# Patient Record
Sex: MALE | Race: BLACK OR AFRICAN AMERICAN | Employment: FULL TIME | URBAN - METROPOLITAN AREA
[De-identification: names, ages, dates, MRNs, and addresses within clinical notes are randomized per-mention and may not be internally consistent; named-entity substitution may affect disease eponyms.]

---

## 2017-03-06 ENCOUNTER — OFFICE VISIT (OUTPATIENT)
Dept: FAMILY MEDICINE | Facility: CLINIC | Age: 62
End: 2017-03-06
Payer: COMMERCIAL

## 2017-03-06 VITALS
BODY MASS INDEX: 26.77 KG/M2 | OXYGEN SATURATION: 95 % | WEIGHT: 187 LBS | HEIGHT: 70 IN | HEART RATE: 93 BPM | DIASTOLIC BLOOD PRESSURE: 74 MMHG | TEMPERATURE: 97.6 F | SYSTOLIC BLOOD PRESSURE: 122 MMHG

## 2017-03-06 DIAGNOSIS — E78.5 HYPERLIPIDEMIA LDL GOAL <130: ICD-10-CM

## 2017-03-06 DIAGNOSIS — H61.23 BILATERAL IMPACTED CERUMEN: ICD-10-CM

## 2017-03-06 DIAGNOSIS — F17.200 TOBACCO USE DISORDER: ICD-10-CM

## 2017-03-06 DIAGNOSIS — I10 HYPERTENSION GOAL BP (BLOOD PRESSURE) < 140/90: Primary | ICD-10-CM

## 2017-03-06 PROCEDURE — 99214 OFFICE O/P EST MOD 30 MIN: CPT | Performed by: FAMILY MEDICINE

## 2017-03-06 PROCEDURE — 36415 COLL VENOUS BLD VENIPUNCTURE: CPT | Performed by: FAMILY MEDICINE

## 2017-03-06 PROCEDURE — 82043 UR ALBUMIN QUANTITATIVE: CPT | Performed by: FAMILY MEDICINE

## 2017-03-06 PROCEDURE — 80061 LIPID PANEL: CPT | Performed by: FAMILY MEDICINE

## 2017-03-06 PROCEDURE — 80053 COMPREHEN METABOLIC PANEL: CPT | Performed by: FAMILY MEDICINE

## 2017-03-06 RX ORDER — SIMVASTATIN 20 MG
20 TABLET ORAL DAILY
Qty: 90 TABLET | Refills: 3 | Status: SHIPPED | OUTPATIENT
Start: 2017-03-06 | End: 2018-05-25

## 2017-03-06 RX ORDER — LISINOPRIL 20 MG/1
20 TABLET ORAL DAILY
Qty: 90 TABLET | Refills: 3 | Status: SHIPPED | OUTPATIENT
Start: 2017-03-06 | End: 2018-05-25

## 2017-03-06 RX ORDER — HYDROCHLOROTHIAZIDE 25 MG/1
25 TABLET ORAL DAILY
Qty: 90 TABLET | Refills: 3 | Status: SHIPPED | OUTPATIENT
Start: 2017-03-06 | End: 2018-05-25

## 2017-03-06 NOTE — PROGRESS NOTES
"Chief Complaint   Patient presents with     Hypertension       Initial /74 (BP Location: Right arm, Patient Position: Chair, Cuff Size: Adult Regular)  Pulse 93  Temp 97.6  F (36.4  C) (Oral)  Ht 5' 10\" (1.778 m)  Wt 187 lb (84.8 kg)  SpO2 95%  BMI 26.83 kg/m2 Estimated body mass index is 26.83 kg/(m^2) as calculated from the following:    Height as of this encounter: 5' 10\" (1.778 m).    Weight as of this encounter: 187 lb (84.8 kg).  Medication Reconciliation: complete     Ksenia Waters MA      "

## 2017-03-06 NOTE — MR AVS SNAPSHOT
"              After Visit Summary   3/6/2017    Bezabeh Assefa    MRN: [id number]           Patient Information     Date Of Birth          1955        Visit Information        Provider Department      3/6/2017 11:00 AM Elias Ballesteros MD [facility]        Today's Diagnoses     Hypertension goal BP (blood pressure) < 140/90    -  1    Bilateral impacted cerumen        Tobacco use disorder        Hyperlipidemia LDL goal <130           Follow-ups after your visit        Who to contact     If you have questions or need follow up information about today's clinic visit or your schedule please contact [facility] directly at [phone].  Normal or non-critical lab and imaging results will be communicated to you by MyChart, letter or phone within 4 business days after the clinic has received the results. If you do not hear from us within 7 days, please contact the clinic through MyChart or phone. If you have a critical or abnormal lab result, we will notify you by phone as soon as possible.  Submit refill requests through [employer] or call your pharmacy and they will forward the refill request to us. Please allow 3 business days for your refill to be completed.          Additional Information About Your Visit        MyChart Information     [employer]ger lets you send messages to your doctor, view your test results, renew your prescriptions, schedule appointments and more. To sign up, go to [email or web address] . Click on \"Log in\" on the left side of the screen, which will take you to the Welcome page. Then click on \"Sign up Now\" on the right side of the page.     You will be asked to enter the access code listed below, as well as some personal information. Please follow the directions to create your username and password.     Your access code is: [email or web address]  Expires: 2017 12:05 PM     Your access code will  in 90 days. If you need help or a new code, please call your " "[facility] or [phone].        Care EveryWhere ID     This is your Care EveryWhere ID. This could be used by other organizations to access your [facility] medical records  [id number]        Your Vitals Were     Pulse Temperature Height Pulse Oximetry BMI (Body Mass Index)       93 97.6  F (36.4  C) (Oral) 5' 10\" (1.778 m) 95% 26.83 kg/m2        Blood Pressure from Last 3 Encounters:   03/06/17 122/74   06/12/16 120/82   05/09/16 116/78    Weight from Last 3 Encounters:   03/06/17 187 lb (84.8 kg)   06/12/16 184 lb (83.5 kg)   05/09/16 188 lb 6.4 oz (85.5 kg)              We Performed the Following     Albumin Random Urine Quantitative     Comprehensive metabolic panel (BMP + Alb, Alk Phos, ALT, AST, Total. Bili, TP)     Lipid Profile     REMOVE IMPACTED CERUMEN          Today's Medication Changes          These changes are accurate as of: 3/6/17 12:05 PM.  If you have any questions, ask your nurse or doctor.               These medicines have changed or have updated prescriptions.        Dose/Directions    hydrochlorothiazide 25 MG tablet   Commonly known as:  HYDRODIURIL   This may have changed:  additional instructions   Used for:  Hypertension goal BP (blood pressure) < 140/90   Changed by:  Elias Ballesteros MD        Dose:  25 mg   Take 1 tablet (25 mg) by mouth daily   Quantity:  90 tablet   Refills:  3       lisinopril 20 MG tablet   Commonly known as:  PRINIVIL/ZESTRIL   This may have changed:  additional instructions   Used for:  Hypertension goal BP (blood pressure) < 140/90   Changed by:  Elias Ballesteros MD        Dose:  20 mg   Take 1 tablet (20 mg) by mouth daily   Quantity:  90 tablet   Refills:  3       simvastatin 20 MG tablet   Commonly known as:  ZOCOR   This may have changed:  additional instructions   Used for:  Hyperlipidemia LDL goal <130   Changed by:  Elias Ballesteros MD        Dose:  20 mg   Take 1 tablet (20 mg) by mouth daily   Quantity:  90 tablet "   Refills:  3            Where to get your medicines      These medications were sent to [id number] - [place], [place] - [address]  [address] [address]     Phone:  [phone]     hydrochlorothiazide 25 MG tablet    lisinopril 20 MG tablet    simvastatin 20 MG tablet                Primary Care Provider Office Phone # Fax #    Vickie Barone [phone]       [facility]  [address]  [facility] [address]        Thank you!     Thank you for choosing [facility]  for your care. Our goal is always to provide you with excellent care. Hearing back from our patients is one way we can continue to improve our services. Please take a few minutes to complete the written survey that you may receive in the mail after your visit with us. Thank you!             Your Updated Medication List - Protect others around you: Learn how to safely use, store and throw away your medicines at www.disposemymeds.org.          This list is accurate as of: 3/6/17 12:05 PM.  Always use your most recent med list.                   Brand Name Dispense Instructions for use    hydrochlorothiazide 25 MG tablet    HYDRODIURIL    90 tablet    Take 1 tablet (25 mg) by mouth daily       lisinopril 20 MG tablet    PRINIVIL/ZESTRIL    90 tablet    Take 1 tablet (20 mg) by mouth daily       simvastatin 20 MG tablet    ZOCOR    90 tablet    Take 1 tablet (20 mg) by mouth daily       vitamin D 2000 UNITS tablet     100 tablet    Take 2,000 Units by mouth daily.

## 2017-03-06 NOTE — PROGRESS NOTES
"  SUBJECTIVE:                                                    Bezabeh Assefa is a 61 year old male who presents to clinic today for the following health issues:      Hypertension Follow-up      Outpatient blood pressures are being checked at home.  Results are 118-125/75-85.    Low Salt Diet: not monitoring salt       Amount of exercise or physical activity: 2-3 days/week for an average of 30-45 minutes    Problems taking medications regularly: No    Medication side effects: none  Diet: regular (no restrictions)    Encounter Diagnoses   Name Primary?     Hypertension goal BP (blood pressure) < 140/90 Yes     Bilateral impacted cerumen, needs irrigation      Tobacco use disorder, trying to cut back      Hyperlipidemia LDL goal <130 on statin         Problem list and histories reviewed & adjusted, as indicated.  Additional history: as documented    Labs reviewed in EPIC    Reviewed and updated as needed this visit by clinical staff       Reviewed and updated as needed this visit by Provider         ROS:  Constitutional, HEENT, cardiovascular, pulmonary, gi and gu systems are negative, except as otherwise noted.    OBJECTIVE:                                                    /74 (BP Location: Right arm, Patient Position: Chair, Cuff Size: Adult Regular)  Pulse 93  Temp 97.6  F (36.4  C) (Oral)  Ht 5' 10\" (1.778 m)  Wt 187 lb (84.8 kg)  SpO2 95%  BMI 26.83 kg/m2  Body mass index is 26.83 kg/(m^2).  GENERAL: healthy, alert and no distress  EYES: Eyes grossly normal to inspection, PERRL and conjunctivae and sclerae normal  HENT: normal cephal  Cerumenosis is noted.  Wax is removed by syringing    are given.  Instructions for home care to prevent wax buildup are given., nose and mouth without ulcers or lesions, oropharynx clear and oral mucous membranes moist  NECK: no adenopathy, no asymmetry, masses, or scars and thyroid normal to palpation  RESP: lungs clear to auscultation - no rales, rhonchi or wheezes  CV: " regular rate and rhythm, normal S1 S2, no S3 or S4, no murmur, click or rub, no peripheral edema and peripheral pulses strong  ABDOMEN: soft, nontender, no hepatosplenomegaly, no masses and bowel sounds normal  PSYCH: mentation appears normal, affect normal/bright  LYMPH: no cervical, supraclavicular, axillary, or inguinal adenopathy         ASSESSMENT/PLAN:                                                            1. Hypertension goal BP (blood pressure) < 140/90  Well controlled   - Lipid Profile  - Comprehensive metabolic panel (BMP + Alb, Alk Phos, ALT, AST, Total. Bili, TP)  - lisinopril (PRINIVIL/ZESTRIL) 20 MG tablet; Take 1 tablet (20 mg) by mouth daily  Dispense: 90 tablet; Refill: 3  - hydrochlorothiazide (HYDRODIURIL) 25 MG tablet; Take 1 tablet (25 mg) by mouth daily  Dispense: 90 tablet; Refill: 3  - Albumin Random Urine Quantitative    2. Bilateral impacted cerumen  resolved  - REMOVE IMPACTED CERUMEN    3. Tobacco use disorder  Work on cessation    4. Hyperlipidemia LDL goal <130  Well controlled   - simvastatin (ZOCOR) 20 MG tablet; Take 1 tablet (20 mg) by mouth daily  Dispense: 90 tablet; Refill: 3  - Lipid Profile    Regular exercise  See Patient Instructions    Elias Ballesteros MD  [facility]

## 2017-03-06 NOTE — LETTER
[place] [id number] [place] [address]  [address]  [facility] [address] [phone]        March 7, 2017      Bezabeh Assefa  [facility] [address]   [facility] [address]          Dear Mr. Chester,    The results of your recent lab tests were within normal limits. Enclosed is a copy of these results.  If you have any further questions or problems, please contact our office.    Sincerely,        Elias Ballesteros MD        Results for orders placed or performed in visit on 03/06/17   Lipid Profile   Result Value Ref Range    Cholesterol 128 <200 mg/dL    Triglycerides 141 <150 mg/dL    HDL Cholesterol 27 (L) >39 mg/dL    LDL Cholesterol Calculated 73 <100 mg/dL    Non HDL Cholesterol 101 <130 mg/dL   Comprehensive metabolic panel (BMP + Alb, Alk Phos, ALT, AST, Total. Bili, TP)   Result Value Ref Range    Sodium 137 133 - 144 mmol/L    Potassium 3.5 3.4 - 5.3 mmol/L    Chloride 104 94 - 109 mmol/L    Carbon Dioxide 25 20 - 32 mmol/L    Anion Gap 8 3 - 14 mmol/L    Glucose 116 (H) 70 - 99 mg/dL    Urea Nitrogen 13 7 - 30 mg/dL    Creatinine 0.92 0.66 - 1.25 mg/dL    GFR Estimate 84 >60 mL/min/1.7m2    GFR Estimate If Black >90   GFR Calc   >60 mL/min/1.7m2    Calcium 9.1 8.5 - 10.1 mg/dL    Bilirubin Total 0.4 0.2 - 1.3 mg/dL    Albumin 4.2 3.4 - 5.0 g/dL    Protein Total 7.8 6.8 - 8.8 g/dL    Alkaline Phosphatase 70 40 - 150 U/L    ALT 35 0 - 70 U/L    AST 24 0 - 45 U/L   Albumin Random Urine Quantitative   Result Value Ref Range    Creatinine Urine 285 mg/dL    Albumin Urine mg/L 13 mg/L    Albumin Urine mg/g Cr 4.53 0 - 17 mg/g Cr

## 2017-03-07 LAB
ALBUMIN SERPL-MCNC: 4.2 G/DL (ref 3.4–5)
ALP SERPL-CCNC: 70 U/L (ref 40–150)
ALT SERPL W P-5'-P-CCNC: 35 U/L (ref 0–70)
ANION GAP SERPL CALCULATED.3IONS-SCNC: 8 MMOL/L (ref 3–14)
AST SERPL W P-5'-P-CCNC: 24 U/L (ref 0–45)
BILIRUB SERPL-MCNC: 0.4 MG/DL (ref 0.2–1.3)
BUN SERPL-MCNC: 13 MG/DL (ref 7–30)
CALCIUM SERPL-MCNC: 9.1 MG/DL (ref 8.5–10.1)
CHLORIDE SERPL-SCNC: 104 MMOL/L (ref 94–109)
CHOLEST SERPL-MCNC: 128 MG/DL
CO2 SERPL-SCNC: 25 MMOL/L (ref 20–32)
CREAT SERPL-MCNC: 0.92 MG/DL (ref 0.66–1.25)
CREAT UR-MCNC: 285 MG/DL
GFR SERPL CREATININE-BSD FRML MDRD: 84 ML/MIN/1.7M2
GLUCOSE SERPL-MCNC: 116 MG/DL (ref 70–99)
HDLC SERPL-MCNC: 27 MG/DL
LDLC SERPL CALC-MCNC: 73 MG/DL
MICROALBUMIN UR-MCNC: 13 MG/L
MICROALBUMIN/CREAT UR: 4.53 MG/G CR (ref 0–17)
NONHDLC SERPL-MCNC: 101 MG/DL
POTASSIUM SERPL-SCNC: 3.5 MMOL/L (ref 3.4–5.3)
PROT SERPL-MCNC: 7.8 G/DL (ref 6.8–8.8)
SODIUM SERPL-SCNC: 137 MMOL/L (ref 133–144)
TRIGL SERPL-MCNC: 141 MG/DL

## 2017-07-26 ENCOUNTER — OFFICE VISIT (OUTPATIENT)
Dept: OPHTHALMOLOGY | Facility: CLINIC | Age: 62
End: 2017-07-26

## 2017-07-26 DIAGNOSIS — H52.222 REGULAR ASTIGMATISM OF LEFT EYE: Primary | ICD-10-CM

## 2017-07-26 DIAGNOSIS — H02.889 MEIBOMIAN GLAND DYSFUNCTION (MGD): ICD-10-CM

## 2017-07-26 DIAGNOSIS — H05.821 EXTRAOCULAR MUSCLE WEAKNESS OF RIGHT EYE: ICD-10-CM

## 2017-07-26 ASSESSMENT — CUP TO DISC RATIO
OS_RATIO: 0.4
OD_RATIO: 0.4

## 2017-07-26 ASSESSMENT — REFRACTION_MANIFEST
OS_CYLINDER: +0.75
OD_ADD: +2.25
OD_SPHERE: +0.50
OD_CYLINDER: SPHERE
OS_SPHERE: PLANO
OS_AXIS: 170
OS_ADD: +2.25

## 2017-07-26 ASSESSMENT — VISUAL ACUITY
OS_SC: 20/20-2
OS_CC: 20/25
OD_SC: 20/20-1
OD_CC: 20/25
METHOD: SNELLEN - LINEAR

## 2017-07-26 ASSESSMENT — CONF VISUAL FIELD
OS_NORMAL: 1
OD_NORMAL: 1

## 2017-07-26 ASSESSMENT — EXTERNAL EXAM - LEFT EYE: OS_EXAM: NORMAL

## 2017-07-26 ASSESSMENT — SLIT LAMP EXAM - LIDS
COMMENTS: TR MGD
COMMENTS: TR MGD

## 2017-07-26 ASSESSMENT — TONOMETRY
OD_IOP_MMHG: 16
OS_IOP_MMHG: 15
IOP_METHOD: APPLANATION

## 2017-07-26 ASSESSMENT — EXTERNAL EXAM - RIGHT EYE: OD_EXAM: NORMAL

## 2017-07-26 NOTE — MR AVS SNAPSHOT
After Visit Summary   2017    Bezabeh Assefa    MRN: [id number]           Patient Information     Date Of Birth          1955        Visit Information        Provider Department      2017 1:20 PM Fred Marquez OD [facility]        Today's Diagnoses     Regular astigmatism of left eye    -  1    Meibomian gland dysfunction (MGD)        Extraocular muscle weakness of right eye           Follow-ups after your visit        Who to contact     Please call your clinic at [phone] to:    Ask questions about your health    Make or cancel appointments    Discuss your medicines    Learn about your test results    Speak to your doctor   If you have compliments or concerns about an experience at your clinic, or if you wish to file a complaint, please contact [facility] Patient Relations at [phone] or email us at [email or web address]         Additional Information About Your Visit        MyChart Information     CloudCover is an electronic gateway that provides easy, online access to your medical records. With CloudCover, you can request a clinic appointment, read your test results, renew a prescription or communicate with your care team.     To sign up for CloudCover visit the website at [email or web address]   You will be asked to enter the access code listed below, as well as some personal information. Please follow the directions to create your username and password.     Your access code is: [email or web address]  Expires: 10/22/2017  6:30 AM     Your access code will  in 90 days. If you need help or a new code, please contact your [facility] or call [phone] for assistance.        Care EveryWhere ID     This is your Care EveryWhere ID. This could be used by other organizations to access your [facility] medical records  [id number]         Blood Pressure from Last 3 Encounters:   17 122/74   16  120/82   05/09/16 116/78    Weight from Last 3 Encounters:   03/06/17 84.8 kg (187 lb)   06/12/16 83.5 kg (184 lb)   05/09/16 85.5 kg (188 lb 6.4 oz)              We Performed the Following     REFRACTION [[id number]]        Primary Care Provider Office Phone # Fax #    Vickie Barone [phone]       [facility] [place]  [address] [facility] [address]        Equal Access to Services     VLADISLAV ENGLISH : Hadii aad ku hadasho Soomaali, waaxda luqadaha, qaybta kaalmada adeegyada, waxay idiin hayaan adeeg kharahazel silverman . [facility] [phone].    ATENCIÓN: Si habla español, tiene a giles disposición servicios gratuitos de asistencia lingüística. Ll[facility] [phone].    We comply with applicable federal civil rights laws and [place] laws. We do not discriminate on the basis of race, color, national origin, age, disability sex, sexual orientation or gender identity.            Thank you!     Thank you for choosing [facility]  for your care. Our goal is always to provide you with excellent care. Hearing back from our patients is one way we can continue to improve our services. Please take a few minutes to complete the written survey that you may receive in the mail after your visit with us. Thank you!             Your Updated Medication List - Protect others around you: Learn how to safely use, store and throw away your medicines at www.disposemymeds.org.          This list is accurate as of: 7/26/17 11:59 PM.  Always use your most recent med list.                   Brand Name Dispense Instructions for use Diagnosis    hydrochlorothiazide 25 MG tablet    HYDRODIURIL    90 tablet    Take 1 tablet (25 mg) by mouth daily    Hypertension goal BP (blood pressure) < 140/90       lisinopril 20 MG tablet    PRINIVIL/ZESTRIL    90 tablet    Take 1 tablet (20 mg) by mouth daily    Hypertension goal BP (blood pressure) < 140/90       simvastatin 20 MG tablet    ZOCOR    90 tablet    Take 1  tablet (20 mg) by mouth daily    Hyperlipidemia LDL goal <130       vitamin D 2000 UNITS tablet     100 tablet    Take 2,000 Units by mouth daily.    Vitamin D deficiency

## 2017-07-26 NOTE — PROGRESS NOTES
Assessment/Plan:  (H52.222) Regular astigmatism of left eye  (primary encounter diagnosis)  Comment: Hyperopia OD, simple hyperopic astigmatism OS, presbyopia OU  Plan: Educated patient on condition and clinical findings. Dispensed spectacle prescription for full time wear. Educated patient on possibility of adaptation period, if symptoms do not improve return to clinic for further testing.    (H02.89) Meibomian gland dysfunction (MGD)  Comment: Symptomatic, likely cause of intermittent blurred vision  Plan: Recommended warm compresses twice each day for ten minutes. Continue using over the counter Systane artificial tears as needed. Monitor annually, or sooner if symptoms worsen.    (H05.821) Extraocular muscle weakness of right eye  Comment: Underaction of right lateral rectus, new onset, no diplopia in primary gaze  Plan: Refer to Dr. Gomez for further evaluation and dilated fundus exam. Patient deferred dilation at this visit.    Freda Fenton, Optometry Extern    Teaching Statement:    Complete documentation of historical and exam elements from today's encounter can  be found in the full encounter summary report (not reduplicated in this progress  note). I personally obtained the chief complaint(s) and history of present illness. I  confirmed and edited as necessary the review of systems, past medical/surgical  history, family history, social history, and examination findings as documented by  others; and I examined the patient myself. I personally reviewed the relevant tests,  images, and reports as documented above. I formulated and edited as necessary the  assessment and plan and discussed the findings and management plan with the  patient and family.    Fred Marquez, OD, FAAO

## 2017-09-06 ENCOUNTER — OFFICE VISIT (OUTPATIENT)
Dept: URGENT CARE | Facility: URGENT CARE | Age: 62
End: 2017-09-06
Payer: COMMERCIAL

## 2017-09-06 VITALS
SYSTOLIC BLOOD PRESSURE: 128 MMHG | HEART RATE: 81 BPM | WEIGHT: 182.5 LBS | TEMPERATURE: 97.5 F | OXYGEN SATURATION: 95 % | BODY MASS INDEX: 26.19 KG/M2 | DIASTOLIC BLOOD PRESSURE: 84 MMHG

## 2017-09-06 DIAGNOSIS — Z72.0 TOBACCO ABUSE: ICD-10-CM

## 2017-09-06 DIAGNOSIS — R05.8 PRODUCTIVE COUGH: Primary | ICD-10-CM

## 2017-09-06 PROCEDURE — 99213 OFFICE O/P EST LOW 20 MIN: CPT | Performed by: FAMILY MEDICINE

## 2017-09-06 RX ORDER — AZITHROMYCIN 250 MG/1
TABLET, FILM COATED ORAL
Qty: 6 TABLET | Refills: 0 | Status: SHIPPED | OUTPATIENT
Start: 2017-09-06 | End: 2017-09-11

## 2017-09-06 NOTE — NURSING NOTE
"Chief Complaint   Patient presents with     Cough     cough and running nose for a few days.        Initial /84  Pulse 81  Temp 97.5  F (36.4  C) (Oral)  Wt 182 lb 8 oz (82.8 kg)  SpO2 95%  BMI 26.19 kg/m2 Estimated body mass index is 26.19 kg/(m^2) as calculated from the following:    Height as of 3/6/17: 5' 10\" (1.778 m).    Weight as of this encounter: 182 lb 8 oz (82.8 kg).  Medication Reconciliation: complete    "

## 2017-09-06 NOTE — MR AVS SNAPSHOT
After Visit Summary   9/6/2017    Bezabeh Assefa    MRN: [id number]           Patient Information     Date Of Birth          1955        Visit Information        Provider Department      9/6/2017 11:00 AM Jae Ortiz DO [facility]        Today's Diagnoses     Productive cough    -  1    Tobacco abuse          Care Instructions      How to Quit Smoking  Smoking is one of the hardest habits to break. About half of all people who have ever smoked have been able to quit. Most people who still smoke want to quit. Here are some of the best ways to stop smoking.    Keep trying  Most smokers make many attempts at quitting before they are successful. It s important not to give up.  Go cold turkey  Most former smokers quit cold turkey (all at once). Trying to cut back gradually doesn't seem to work as well, perhaps because it continues the smoking habit. Also, it is possible to inhale more while smoking fewer cigarettes. This results in the same amount of nicotine in your body.  Get support  Support programs can be a big help, especially for heavy smokers. These groups offer lectures, ways to change behavior, and peer support. Here are some ways to find a support program:    Free national quitline: 800-QUIT-NOW (856-601-3675).    Hospital quit-smoking programs.    American Lung Association: (823.693.4252).    American Cancer Society (011-011-3852).  Support at home is important too. Nonsmokers can offer praise and encouragement. If the smoker in your life finds it hard to quit, encourage them to keep trying.  Over-the-counter medicines  Nicotine replacement therapy may make quitting easier. Certain aids, such as the nicotine patch, gum, and lozenges, are available without a prescription. It is best to use these under a doctor s care, though. The skin patch provides a steady supply of nicotine. Nicotine gum and lozenges give temporary bursts of low levels of nicotine. Both  "methods reduce the craving for cigarettes. Warning: If you have nausea, vomiting, dizziness, weakness, or a fast heartbeat, stop using these products and see your doctor.  Prescription medicines  After reviewing your smoking patterns and past attempts to quit, your doctor may offer a prescription medicine such as bupropion, varenicline, a nicotine inhaler, or nasal spray. Each has advantages and side effects. Your doctor can review these with you.  Health benefits of quitting  The benefits of quitting start right away and keep improving the longer you go without smoking. These benefits occur at any age.  So whether you are 17 or 70, quitting is a good decision. Some of the benefits include:    20 minutes: Blood pressure and pulse return to normal.    8 hours: Oxygen levels return to normal.    2 days: Ability to smell and taste begin to improve as damaged nerves regrow.    2 to 3 weeks: Circulation and lung function improve.    1 to 9 months: Coughing, congestion, and shortness of breath decrease; tiredness decreases.    1 year: Risk of heart attack decreases by half.    5 years: Risk of lung cancer decreases by half; risk of stroke becomes the same as a nonsmoker s.  For more on how to quit smoking, try these online resources:     Smokefree.gov    \"Clearing the Air\" booklet from the National Cancer [place]: smokefree.gov/sites/default/files/pdf/clearing-the-air-accessible.pdf  Date Last Reviewed: 3/1/2017    8468-7845 [employer]. [address] [place] [address]. All rights reserved. This information is not intended as a substitute for professional medical care. Always follow your healthcare professional's instructions.                Follow-ups after your visit        Who to contact     If you have questions or need follow up information about today's clinic visit or your schedule please contact [facility] directly at [phone].  Normal or non-critical lab " "and imaging results will be communicated to you by MyChart, letter or phone within 4 business days after the clinic has received the results. If you do not hear from us within 7 days, please contact the clinic through Codasystemt or phone. If you have a critical or abnormal lab result, we will notify you by phone as soon as possible.  Submit refill requests through Zymeworks or call your pharmacy and they will forward the refill request to us. Please allow 3 business days for your refill to be completed.          Additional Information About Your Visit        [email or web address] Information     Zymeworks lets you send messages to your doctor, view your test results, renew your prescriptions, schedule appointments and more. To sign up, go to [email or web address] . Click on \"Log in\" on the left side of the screen, which will take you to the Welcome page. Then click on \"Sign up Now\" on the right side of the page.     You will be asked to enter the access code listed below, as well as some personal information. Please follow the directions to create your username and password.     Your access code is: [email or web address]  Expires: 10/22/2017  6:30 AM     Your access code will  in 90 days. If you need help or a new code, please call your [facility] clinic or [phone].        Care EveryWhere ID     This is your Care EveryWhere ID. This could be used by other organizations to access your [facility] medical records  [id number]        Your Vitals Were     Pulse Temperature Pulse Oximetry BMI (Body Mass Index)          81 97.5  F (36.4  C) (Oral) 95% 26.19 kg/m2         Blood Pressure from Last 3 Encounters:   17 128/84   17 122/74   16 120/82    Weight from Last 3 Encounters:   17 182 lb 8 oz (82.8 kg)   17 187 lb (84.8 kg)   16 184 lb (83.5 kg)              Today, you had the following     No orders found for display         Today's Medication Changes          These changes are accurate as of: 17 " 11:37 AM.  If you have any questions, ask your nurse or doctor.               Start taking these medicines.        Dose/Directions    azithromycin 250 MG tablet   Commonly known as:  ZITHROMAX   Used for:  Productive cough, Tobacco abuse   Started by:  Jae Ortiz, DO        Two tablets first day, then one tablet daily for four days.   Quantity:  6 tablet   Refills:  0            Where to get your medicines      These medications were sent to [place] [address] [place] [facility] [address] [facility]  [address] [facility] [address]     Phone:  [phone]     azithromycin 250 MG tablet                Primary Care Provider Office Phone # Fax #    Vickie Barone [phone]       [facility] [place]  [address] [facility] [address]        Equal Access to Services     VLADISLAV ENGLISH : Hadii aad ku hadasho Sogurpreet, waaxda luqadaha, qaybta kaalmada adeegyada, waxay dionna calles. So [facility] [phone].    ATENCIÓN: Si habla español, tiene a giles disposición servicios gratuitos de asistencia lingüística. [facility] [phone].    We comply with applicable federal civil rights laws and [place] laws. We do not discriminate on the basis of race, color, national origin, age, disability sex, sexual orientation or gender identity.            Thank you!     Thank you for choosing [facility]  for your care. Our goal is always to provide you with excellent care. Hearing back from our patients is one way we can continue to improve our services. Please take a few minutes to complete the written survey that you may receive in the mail after your visit with us. Thank you!             Your Updated Medication List - Protect others around you: Learn how to safely use, store and throw away your medicines at www.disposemymeds.org.          This list is accurate as of: 9/6/17 11:37 AM.  Always use your most recent med list.                    Brand Name Dispense Instructions for use Diagnosis    azithromycin 250 MG tablet    ZITHROMAX    6 tablet    Two tablets first day, then one tablet daily for four days.    Productive cough, Tobacco abuse       hydrochlorothiazide 25 MG tablet    HYDRODIURIL    90 tablet    Take 1 tablet (25 mg) by mouth daily    Hypertension goal BP (blood pressure) < 140/90       lisinopril 20 MG tablet    PRINIVIL/ZESTRIL    90 tablet    Take 1 tablet (20 mg) by mouth daily    Hypertension goal BP (blood pressure) < 140/90       simvastatin 20 MG tablet    ZOCOR    90 tablet    Take 1 tablet (20 mg) by mouth daily    Hyperlipidemia LDL goal <130       vitamin D 2000 UNITS tablet     100 tablet    Take 2,000 Units by mouth daily.    Vitamin D deficiency

## 2017-09-06 NOTE — PATIENT INSTRUCTIONS

## 2017-09-06 NOTE — PROGRESS NOTES
"SUBJECTIVE: Bezabeh Assefa is a 62 year old male presenting with a chief complaint of cough .  Onset of symptoms was 1 week(s) ago.  Course of illness is worsening.    Severity moderate  Current and Associated symptoms: \"cold symptoms\"  Treatment measures tried include OTC meds.  Predisposing factors include tobacco abuse.    Past Medical History:   Diagnosis Date     History of colonoscopy     Next due 5 years (11/2017)     Hypertension      No Known Allergies  Social History   Substance Use Topics     Smoking status: Current Every Day Smoker     Packs/day: 1.00     Years: 30.00     Types: Cigarettes     Smokeless tobacco: Never Used     Alcohol use Yes      Comment: 1 every 3 - 4 months       ROS:  SKIN: no rash  GI: no vomiting    OBJECTIVE:  /84  Pulse 81  Temp 97.5  F (36.4  C) (Oral)  Wt 182 lb 8 oz (82.8 kg)  SpO2 95%  BMI 26.19 kg/[id number] APPEARANCE: healthy, alert and no distress  EYES: EOMI,  PERRL, conjunctiva clear  HENT: ear canals and TM's normal.  Nose and mouth without ulcers, erythema or lesions  NECK: supple, nontender, no lymphadenopathy  RESP: lungs clear to auscultation - no rales, rhonchi or wheezes  SKIN: no suspicious lesions or rashes      ICD-10-CM    1. Productive cough R05 azithromycin (ZITHROMAX) 250 MG tablet   2. Tobacco abuse Z72.0 azithromycin (ZITHROMAX) 250 MG tablet       Fluids/Rest, f/u if worse/not any better    "

## 2017-10-05 ENCOUNTER — OFFICE VISIT (OUTPATIENT)
Dept: FAMILY MEDICINE | Facility: CLINIC | Age: 62
End: 2017-10-05
Payer: COMMERCIAL

## 2017-10-05 VITALS — SYSTOLIC BLOOD PRESSURE: 127 MMHG | HEART RATE: 80 BPM | TEMPERATURE: 97.3 F | DIASTOLIC BLOOD PRESSURE: 87 MMHG

## 2017-10-05 DIAGNOSIS — Z71.84 TRAVEL ADVICE ENCOUNTER: Primary | ICD-10-CM

## 2017-10-05 DIAGNOSIS — Z23 NEED FOR VACCINATION: ICD-10-CM

## 2017-10-05 PROCEDURE — 90707 MMR VACCINE SC: CPT | Mod: GA | Performed by: NURSE PRACTITIONER

## 2017-10-05 PROCEDURE — 90471 IMMUNIZATION ADMIN: CPT | Mod: GA | Performed by: NURSE PRACTITIONER

## 2017-10-05 PROCEDURE — 99402 PREV MED CNSL INDIV APPRX 30: CPT | Mod: 25 | Performed by: NURSE PRACTITIONER

## 2017-10-05 PROCEDURE — 90734 MENACWYD/MENACWYCRM VACC IM: CPT | Mod: GA | Performed by: NURSE PRACTITIONER

## 2017-10-05 PROCEDURE — 90472 IMMUNIZATION ADMIN EACH ADD: CPT | Mod: GA | Performed by: NURSE PRACTITIONER

## 2017-10-05 PROCEDURE — 90691 TYPHOID VACCINE IM: CPT | Mod: GA | Performed by: NURSE PRACTITIONER

## 2017-10-05 PROCEDURE — 90717 YELLOW FEVER VACCINE SUBQ: CPT | Mod: GA | Performed by: NURSE PRACTITIONER

## 2017-10-05 PROCEDURE — 90632 HEPA VACCINE ADULT IM: CPT | Mod: GA | Performed by: NURSE PRACTITIONER

## 2017-10-05 RX ORDER — AZITHROMYCIN 500 MG/1
500 TABLET, FILM COATED ORAL DAILY
Qty: 3 TABLET | Refills: 0 | Status: SHIPPED | OUTPATIENT
Start: 2017-10-05 | End: 2017-10-08

## 2017-10-05 RX ORDER — ATOVAQUONE AND PROGUANIL HYDROCHLORIDE 250; 100 MG/1; MG/1
1 TABLET, FILM COATED ORAL DAILY
Qty: 45 TABLET | Refills: 0 | Status: SHIPPED | OUTPATIENT
Start: 2017-10-05 | End: 2019-06-20

## 2017-10-05 NOTE — MR AVS SNAPSHOT
After Visit Summary   10/5/2017    Bezabeh Assefa    MRN: [id number]           Patient Information     Date Of Birth          1955        Visit Information        Provider Department      10/5/2017 10:15 AM Hallie Hernandez APRN CNP [facility]        Today's Diagnoses     Travel advice encounter    -  1    Need for vaccination          Care Instructions    Today October 5, 2017 you received the    Hepatitis A Vaccine - Please return on 4/3/18 or later for your 2nd and final dose.    Yellow Fever (YF)    Meningococcal (Menactra) Vaccine    MMR (Measles Mumps Rubella) Vaccine    Typhoid - injectable. This vaccine is valid for two years.   .    These appointments can be made as a NURSE ONLY visit.    **It is very important for the vaccinations to be given on the scheduled day(s), this helps ensure you receive the full effectiveness of the vaccine.**    Please call [facility] with any questions [phone]    Thank you for visiting [facility]              Follow-ups after your visit        Who to contact     If you have questions or need follow up information about today's clinic visit or your schedule please contact [facility] directly at [phone].  Normal or non-critical lab and imaging results will be communicated to you by MyChart, letter or phone within 4 business days after the clinic has received the results. If you do not hear from us within 7 days, please contact the clinic through Evergagehart or phone. If you have a critical or abnormal lab result, we will notify you by phone as soon as possible.  Submit refill requests through [email or web address] or call your pharmacy and they will forward the refill request to us. Please allow 3 business days for your refill to be completed.          Additional Information About Your Visit        Evergagehart Information     [email or web address] lets you send messages to your doctor, view your test results, renew your  "prescriptions, schedule appointments and more. To sign up, go to [email or web address] . Click on \"Log in\" on the left side of the screen, which will take you to the Welcome page. Then click on \"Sign up Now\" on the right side of the page.     You will be asked to enter the access code listed below, as well as some personal information. Please follow the directions to create your username and password.     Your access code is: [email or web address]  Expires: 10/22/2017  6:30 AM     Your access code will  in 90 days. If you need help or a new code, please call your [facility] clinic or [phone].        Care EveryWhere ID     This is your Care EveryWhere ID. This could be used by other organizations to access your [facility] medical records  [id number]        Your Vitals Were     Pulse Temperature                80 97.3  F (36.3  C) (Oral)           Blood Pressure from Last 3 Encounters:   10/05/17 127/87   17 128/84   17 122/74    Weight from Last 3 Encounters:   17 182 lb 8 oz (82.8 kg)   17 187 lb (84.8 kg)   16 184 lb (83.5 kg)              We Performed the Following     C YELLOW FEVER IMMUNIZATION, LIVE, SQ (STAMARIL)     HEPA VACCINE ADULT IM     MENINGOCOCCAL VACCINE,IM (MENACTRA)     MMR VIRUS IMMUNIZATION, SUBCUT     TYPHOID VACCINE, IM          Today's Medication Changes          These changes are accurate as of: 10/5/17 11:05 AM.  If you have any questions, ask your nurse or doctor.               Start taking these medicines.        Dose/Directions    atovaquone-proguanil 250-100 MG per tablet   Commonly known as:  MALARONE   Used for:  Travel advice encounter   Started by:  Hallie Hernandez APRN CNP        Dose:  1 tablet   Take 1 tablet by mouth daily Start 2 days before exposure to Malaria and continue daily till  7 days after exposure.   Quantity:  45 tablet   Refills:  0       azithromycin 500 MG tablet   Commonly known as:  ZITHROMAX   Used for:  Travel advice encounter "   Started by:  Hallie Hernandez APRN CNP        Dose:  500 mg   Take 1 tablet (500 mg) by mouth daily for 3 doses Take 1 tablet a day for up to 3 days for severe diarrhea   Quantity:  3 tablet   Refills:  0            Where to get your medicines      These medications were sent to [facility] [address] [place] [facility] [place], [place] - [address] [facility]  [address] [facility] [address]     Phone:  [phone]     atovaquone-proguanil 250-100 MG per tablet    azithromycin 500 MG tablet                Primary Care Provider Office Phone # Fax #    Vickie Barone [phone]       [facility] [place]  [address]  [facility] [address]        Equal Access to Services     VLADISLAV ENGLISH : Ailyn hurdo Sogurpreet, waaxda lukenrickadaha, qaybta kaalmada adewilliamsyada, suleiman silverman . So [facility] [phone].    ATENCIÓN: Si habla español, tiene a giles disposición servicios gratuitos de asistencia lingüística. Llame al [phone].    We comply with applicable federal civil rights laws and [place] laws. We do not discriminate on the basis of race, color, national origin, age, disability, sex, sexual orientation, or gender identity.            Thank you!     Thank you for choosing [facility]  for your care. Our goal is always to provide you with excellent care. Hearing back from our patients is one way we can continue to improve our services. Please take a few minutes to complete the written survey that you may receive in the mail after your visit with us. Thank you!             Your Updated Medication List - Protect others around you: Learn how to safely use, store and throw away your medicines at www.disposemymeds.org.          This list is accurate as of: 10/5/17 11:05 AM.  Always use your most recent med list.                   Brand Name Dispense Instructions for use Diagnosis    atovaquone-proguanil 250-100 MG per tablet    MALARONE    45 tablet    Take 1 tablet  by mouth daily Start 2 days before exposure to Malaria and continue daily till  7 days after exposure.    Travel advice encounter       azithromycin 500 MG tablet    ZITHROMAX    3 tablet    Take 1 tablet (500 mg) by mouth daily for 3 doses Take 1 tablet a day for up to 3 days for severe diarrhea    Travel advice encounter       hydrochlorothiazide 25 MG tablet    HYDRODIURIL    90 tablet    Take 1 tablet (25 mg) by mouth daily    Hypertension goal BP (blood pressure) < 140/90       lisinopril 20 MG tablet    PRINIVIL/ZESTRIL    90 tablet    Take 1 tablet (20 mg) by mouth daily    Hypertension goal BP (blood pressure) < 140/90       simvastatin 20 MG tablet    ZOCOR    90 tablet    Take 1 tablet (20 mg) by mouth daily    Hyperlipidemia LDL goal <130       vitamin D 2000 UNITS tablet     100 tablet    Take 2,000 Units by mouth daily.    Vitamin D deficiency

## 2017-10-05 NOTE — PROGRESS NOTES
Nurse Note      Itinerary:  [facility]      Departure Date: 11/15/2017      Return Date: 12/19/2017      Length of Trip 5 weeks      Reason for Travel: Visiting friends and relatives           Urban or rural: Both      Accommodations: Family home        IMMUNIZATION HISTORY  Have you received any immunizations within the past 4 weeks?  No  Have you ever fainted from having your blood drawn or from an injection?  No  Have you ever had a fever reaction to vaccination?  No  Have you ever had any bad reaction or side effect from any vaccination?  No  Have you ever had hepatitis A or B vaccine?  No  Do you live (or work closely) with anyone who has AIDS, an AIDS-like condition, any other immune disorder or who is on chemotherapy for cancer?  No  Do you have a family history of immunodeficiency?  No  Have you received any injection of immune globulin or any blood products during the past 12 months?  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  Patient will be departing in  5 week(s) and staying for   5 week(s) and  traveling with a freind.      Patient itinerary :  will be in the urban and rural region of [facility] and [place] [facility] which presents risk for Malaria, Yellow Fever, Dengue Fever, Chikungungya, Zika,  Trypanosomiasis, Schistosomiasis, Rabies, food borne illnesses, motor vehicle accidents, Typhoid, Leishmaniasis and Lassa Fever. exposure.      Patient's activities will include sightseeing, visiting friends and relatives and travel by car or other vehicle.    Patient's country of birth is [facility], arrival to  1977    Special medical concerns: none  Pre-travel questionnaire was completed by patient and reviewed by provider.     Vitals: /87  Pulse 80  Temp 97.3  F (36.3  C) (Oral)  BMI= There is no height or weight on file to calculate  BMI.    EXAM:  General:  Well-nourished, well-developed in no acute distress.  Appears to be stated age, interacts appropriately and expresses understanding of information given to patient.    Current Outpatient Prescriptions   Medication Sig Dispense Refill     atovaquone-proguanil (MALARONE) 250-100 MG per tablet Take 1 tablet by mouth daily Start 2 days before exposure to Malaria and continue daily till  7 days after exposure. 45 tablet 0     azithromycin (ZITHROMAX) 500 MG tablet Take 1 tablet (500 mg) by mouth daily for 3 doses Take 1 tablet a day for up to 3 days for severe diarrhea 3 tablet 0     simvastatin (ZOCOR) 20 MG tablet Take 1 tablet (20 mg) by mouth daily 90 tablet 3     lisinopril (PRINIVIL/ZESTRIL) 20 MG tablet Take 1 tablet (20 mg) by mouth daily 90 tablet 3     hydrochlorothiazide (HYDRODIURIL) 25 MG tablet Take 1 tablet (25 mg) by mouth daily 90 tablet 3     Cholecalciferol (VITAMIN D) 2000 UNITS tablet Take 2,000 Units by mouth daily. (Patient not taking: Reported on 9/6/2017) 100 tablet 3     Patient Active Problem List   Diagnosis     Hyperlipidemia LDL goal <130     Tobacco use disorder     Hypertension goal BP (blood pressure) < 140/90     No Known Allergies      Immunizations discussed include:   Hepatitis A:  Ordered/given today, risks, benefits and side effects reviewed  Hepatitis B: Declined  Not concerned about risk of disease  Influenza: Declined  Not concerned about risk of disease  Typhoid: Ordered/given today, risks, benefits and side effects reviewed  Rabies: Declined  reviewed managment of a animal bite or scratch (washing wound, seek medical care within 24 hours for post exposure prophylaxis )  Yellow Fever: Stamaril Ordered/given today - consent completed, side effects, precautions, allergies, risks discussed. Patient expressed understanding.  Syriac Encephalitis: Not indicated  Meningococcus: Ordered/given today, risks, benefits and side effects  reviewed  Tetanus/Diphtheria: up to date  Measles/Mumps/Rubella: Ordered/given today  Cholera: Not needed  Polio: Up to date  Pneumococcal: Under age of 65  Varicella: status unknown declines vaccie  Zostavax:  Not indicated  HPV:  Not indicated  TB:  declines    Stamaril Informed Consent    The patient was provided with a copy of the IRB-approved consent form and all questions were answered before the patient agreed to participate by signing the informed consent document.   A copy of the form was provided to the patient.    Date: 10/05/2017  Consent Version Date: 05/10/2017  Consent Obtained by:  MANNY falcon NP  HIPAA:  Yes  HIPAA Authorization Signed Date: 10/05/2017      Inclusion/Exclusion Criteria:    (Similar to Yellow Fever-VAX)      The patient met all of the following inclusion criteria in order to be eligible for the Stamaril vaccination under this EAP (Expanded Access Investigational New Drug Program)           At increased risk for YF, including researchers, laboratory workers, vaccine production staff, and those who are traveling within 30 days to a YF-endemic region or to a country requiring proof of YF vaccination under IHRs (International Health Regulations)?       Yes     Patient is greater than or equal to 9 months of age on the day of vaccination?     Yes     Patient is greater than or equal to 18 years of age and signed and dated the Consent Forms?     yes     Patient is < 18 years of age and parent(s)/guardian(s) signed and dated the Consent Forms?      Patient is 7 years to < 18 years of age and signed and dated the Assent form?        No Assent is required.  Patient is <7 years of age.     No      No      N/A     The patient did not meet any of the following criteria that would have excluded the patient from receiving the Stamaril vaccination under this EAP              Patient is less than 9 months of age.       No     The patient is breast-feeding and cannot stop nursing for at least 14 days  after vaccination.    Note: Yellow Fever vaccine virus may be transmissible via breast milk by nursing mothers who are vaccinated during the final 2 weeks of pregnancy or post-partum.   Following transmission, infants may develop encephalitis.  The minimum time of discontinuation of breastfeeding for 14 days after vaccination is based on the expected clearance of live-attenuated vaccine virus.       No     The patient is immunosuppressed, whether congenital or idiopathic, including for example, leukemia, lymphoma, other malignancies, and patients who are receiving immunosuppressant medications (e.g. Systemic corticosteroids [greater than the standard dose of topical or inhaled steroids], alkylating drugs, antimetabolites, of other cytotoxic or immunomodulatory drugs) or radiation therapy or organ transplantation.       No     The patient has known hypersensitivity to the active substance or to any of the excipients of Stamaril vaccine or to eggs or chicken proteins.     No     The patient is symptomatic for human immunodeficiency virus (HIV) infection     No     The patient is asymptomatic for HIV infection but accompanied by evidence of severe immune suppression    Note:  Evidence of severe immune suppression includes CD4+ T-cell counts < 200 cubic millimeters (or < 15% total lymphocytes in children aged < 6 years), or as determined by the health care provider.       No     The patient has a history of thymus dysfunction (including myasthenia gravis, thymoma, thymectomy)     No     Moderate or severe febrile illness or acute illness    Note: Participation in the EAP can be reassessed when moderate or severe febrile illness or acute illness has resolved.       No       Altitude Exposure on this trip: no  Past tolerance to Altitude: na    ASSESSMENT/PLAN:    ICD-10-CM    1. Travel advice encounter Z71.89 C YELLOW FEVER IMMUNIZATION, LIVE, SQ (STAMARIL)     HEPA VACCINE ADULT IM     TYPHOID VACCINE, IM     MMR VIRUS  IMMUNIZATION, SUBCUT     MENINGOCOCCAL VACCINE,IM (MENACTRA)     atovaquone-proguanil (MALARONE) 250-100 MG per tablet     azithromycin (ZITHROMAX) 500 MG tablet   2. Need for vaccination Z23 C YELLOW FEVER IMMUNIZATION, LIVE, SQ (STAMARIL)     HEPA VACCINE ADULT IM     TYPHOID VACCINE, IM     MMR VIRUS IMMUNIZATION, SUBCUT     MENINGOCOCCAL VACCINE,IM (MENACTRA)     I have reviewed general recommendations for safe travel   including: food/water precautions, insect precautions, safer sex   practices given high prevalence of Zika, HIV and other STDs,   roadway safety. Educational materials and [employer] report provided.    Malaraia prophylaxis recommended: Malarone  Symptomatic treatment for traveler's diarrhea: azithromycin  Altitude illness prevention and treatment: none      Evacuation insurance advised and resources were provided to patient.    Total visit time 30 minutes  with over 50% of time spent counseling patient as detailed above.    Hallie Hernandez CNP

## 2017-10-05 NOTE — PATIENT INSTRUCTIONS
Today October 5, 2017 you received the    Hepatitis A Vaccine - Please return on 4/3/18 or later for your 2nd and final dose.    Yellow Fever (YF)    Meningococcal (Menactra) Vaccine    MMR (Measles Mumps Rubella) Vaccine    Typhoid - injectable. This vaccine is valid for two years.   .    These appointments can be made as a NURSE ONLY visit.    **It is very important for the vaccinations to be given on the scheduled day(s), this helps ensure you receive the full effectiveness of the vaccine.**    Please call [facility] with any questions [phone]    Thank you for visiting [facility]

## 2017-10-25 ENCOUNTER — RADIANT APPOINTMENT (OUTPATIENT)
Dept: GENERAL RADIOLOGY | Facility: CLINIC | Age: 62
End: 2017-10-25
Attending: FAMILY MEDICINE
Payer: COMMERCIAL

## 2017-10-25 ENCOUNTER — OFFICE VISIT (OUTPATIENT)
Dept: URGENT CARE | Facility: URGENT CARE | Age: 62
End: 2017-10-25
Payer: COMMERCIAL

## 2017-10-25 VITALS
TEMPERATURE: 97.9 F | BODY MASS INDEX: 26.27 KG/M2 | OXYGEN SATURATION: 96 % | DIASTOLIC BLOOD PRESSURE: 72 MMHG | RESPIRATION RATE: 16 BRPM | HEART RATE: 84 BPM | WEIGHT: 183.1 LBS | SYSTOLIC BLOOD PRESSURE: 122 MMHG

## 2017-10-25 DIAGNOSIS — F17.200 TOBACCO USE DISORDER: ICD-10-CM

## 2017-10-25 DIAGNOSIS — R09.81 NASAL CONGESTION: ICD-10-CM

## 2017-10-25 DIAGNOSIS — R05.9 COUGH: ICD-10-CM

## 2017-10-25 DIAGNOSIS — R05.9 COUGH: Primary | ICD-10-CM

## 2017-10-25 PROCEDURE — 99214 OFFICE O/P EST MOD 30 MIN: CPT | Performed by: FAMILY MEDICINE

## 2017-10-25 PROCEDURE — 71020 XR CHEST 2 VW: CPT

## 2017-10-25 RX ORDER — CODEINE PHOSPHATE AND GUAIFENESIN 10; 100 MG/5ML; MG/5ML
SOLUTION ORAL
Qty: 120 ML | Refills: 0 | Status: SHIPPED | OUTPATIENT
Start: 2017-10-25 | End: 2017-10-30

## 2017-10-25 RX ORDER — ALBUTEROL SULFATE 90 UG/1
2 AEROSOL, METERED RESPIRATORY (INHALATION) 4 TIMES DAILY PRN
Qty: 1 INHALER | Refills: 0 | Status: SHIPPED | OUTPATIENT
Start: 2017-10-25 | End: 2018-05-25

## 2017-10-25 NOTE — MR AVS SNAPSHOT
"              After Visit Summary   10/25/2017    Bezabeh Assefa    MRN: [id number]           Patient Information     Date Of Birth          1955        Visit Information        Provider Department      10/25/2017 2:45 PM Jae Ortiz,  [facility]        Today's Diagnoses     Cough    -  1    Nasal congestion        Tobacco use disorder           Follow-ups after your visit        Who to contact     If you have questions or need follow up information about today's clinic visit or your schedule please contact [facility] directly at [phone].  Normal or non-critical lab and imaging results will be communicated to you by [employer], letter or phone within 4 business days after the clinic has received the results. If you do not hear from us within 7 days, please contact the clinic through [employer] or phone. If you have a critical or abnormal lab result, we will notify you by phone as soon as possible.  Submit refill requests through [employer] or call your pharmacy and they will forward the refill request to us. Please allow 3 business days for your refill to be completed.          Additional Information About Your Visit        MyChart Information     [employer] lets you send messages to your doctor, view your test results, renew your prescriptions, schedule appointments and more. To sign up, go to [email or web address] . Click on \"Log in\" on the left side of the screen, which will take you to the Welcome page. Then click on \"Sign up Now\" on the right side of the page.     You will be asked to enter the access code listed below, as well as some personal information. Please follow the directions to create your username and password.     Your access code is: [email or web address]  Expires: 2018  3:31 PM     Your access code will  in 90 days. If you need help or a new code, please call your [place] clinic or [phone].        Care EveryWhere ID     This " is your Care EveryWhere ID. This could be used by other organizations to access your [place] medical records  [id number]        Your Vitals Were     Pulse Temperature Respirations Pulse Oximetry BMI (Body Mass Index)       84 97.9  F (36.6  C) (Oral) 16 96% 26.27 kg/m2        Blood Pressure from Last 3 Encounters:   10/25/17 122/72   10/05/17 127/87   09/06/17 128/84    Weight from Last 3 Encounters:   10/25/17 183 lb 1.6 oz (83.1 kg)   09/06/17 182 lb 8 oz (82.8 kg)   03/06/17 187 lb (84.8 kg)                 Today's Medication Changes          These changes are accurate as of: 10/25/17  3:31 PM.  If you have any questions, ask your nurse or doctor.               Start taking these medicines.        Dose/Directions    albuterol 108 (90 BASE) MCG/ACT Inhaler   Commonly known as:  PROAIR HFA   Used for:  Cough   Started by:  Jae Ortiz DO        Dose:  2 puff   Inhale 2 puffs into the lungs 4 times daily as needed for shortness of breath / dyspnea or wheezing   Quantity:  1 Inhaler   Refills:  0       guaiFENesin-codeine 100-10 MG/5ML Soln solution   Commonly known as:  ROBITUSSIN AC   Used for:  Cough   Started by:  Jae Ortiz DO        1-2 tsp po q 4-6hrs prn cough   Quantity:  120 mL   Refills:  0            Where to get your medicines      These medications were sent to [place] [address] [place] [facility] [address] [address]     Phone:  [phone]     albuterol 108 (90 BASE) MCG/ACT Inhaler         Some of these will need a paper prescription and others can be bought over the counter.  Ask your nurse if you have questions.     Bring a paper prescription for each of these medications     guaiFENesin-codeine 100-10 MG/5ML Soln solution                Primary Care Provider Office Phone # Fax #    Vickie Barone [phone]       [facility] [place]  [address] [facility] [address]        Equal Access to Services     VLADISLAV  GAAR : Hadii aad ku hadriannao Soemmyali, waaxda luqadaha, qaybta kaalmada adeegyada, suleiman cathyin hayaan adewilliams telles andra . So [facility] [phone].    ATENCIÓN: Si habla español, tiene a giles disposición servicios gratuitos de asistencia lingüística. Llame al [phone].    We comply with applicable federal civil rights laws and [place] laws. We do not discriminate on the basis of race, color, national origin, age, disability, sex, sexual orientation, or gender identity.            Thank you!     Thank you for choosing [facility]  for your care. Our goal is always to provide you with excellent care. Hearing back from our patients is one way we can continue to improve our services. Please take a few minutes to complete the written survey that you may receive in the mail after your visit with us. Thank you!             Your Updated Medication List - Protect others around you: Learn how to safely use, store and throw away your medicines at www.disposemymeds.org.          This list is accurate as of: 10/25/17  3:31 PM.  Always use your most recent med list.                   Brand Name Dispense Instructions for use Diagnosis    albuterol 108 (90 BASE) MCG/ACT Inhaler    PROAIR HFA    1 Inhaler    Inhale 2 puffs into the lungs 4 times daily as needed for shortness of breath / dyspnea or wheezing    Cough       atovaquone-proguanil 250-100 MG per tablet    MALARONE    45 tablet    Take 1 tablet by mouth daily Start 2 days before exposure to Malaria and continue daily till  7 days after exposure.    Travel advice encounter       guaiFENesin-codeine 100-10 MG/5ML Soln solution    ROBITUSSIN AC    120 mL    1-2 tsp po q 4-6hrs prn cough    Cough       hydrochlorothiazide 25 MG tablet    HYDRODIURIL    90 tablet    Take 1 tablet (25 mg) by mouth daily    Hypertension goal BP (blood pressure) < 140/90       lisinopril 20 MG tablet    PRINIVIL/ZESTRIL    90 tablet    Take 1 tablet (20 mg) by  mouth daily    Hypertension goal BP (blood pressure) < 140/90       simvastatin 20 MG tablet    ZOCOR    90 tablet    Take 1 tablet (20 mg) by mouth daily    Hyperlipidemia LDL goal <130       vitamin D 2000 UNITS tablet     100 tablet    Take 2,000 Units by mouth daily.    Vitamin D deficiency

## 2017-10-25 NOTE — PROGRESS NOTES
SUBJECTIVE: Bezabeh Assefa is a 62 year old male presenting with a chief complaint of nasal congestion and cough .  Onset of symptoms was 1 week(s) ago.  Course of illness is same.    Severity moderate  Current and Associated symptoms: runny nose, stuffy nose and cough - non-productive  Treatment measures tried include None tried.  Predisposing factors include tobacco use.    Past Medical History:   Diagnosis Date     History of colonoscopy     Next due 5 years (11/2017)     Hypertension      No Known Allergies  Social History   Substance Use Topics     Smoking status: Current Every Day Smoker     Packs/day: 1.00     Years: 30.00     Types: Cigarettes     Smokeless tobacco: Never Used     Alcohol use Yes      Comment: 1 every 3 - 4 months       ROS:  SKIN: no rash  GI: no vomiting    OBJECTIVE:  /72  Pulse 84  Temp 97.9  F (36.6  C) (Oral)  Resp 16  Wt 183 lb 1.6 oz (83.1 kg)  SpO2 96%  BMI 26.27 kg/u8UOYAITE APPEARANCE: healthy, alert and no distress  EYES: EOMI,  PERRL, conjunctiva clear  HENT: ear canals and TM's normal.  Nose and mouth without ulcers, erythema or lesions  NECK: supple, nontender, no lymphadenopathy  RESP: lungs clear to auscultation - no rales, rhonchi or wheezes  SKIN: no suspicious lesions or rashes    Xray without acute findings, read by Jae Ortiz D.O.      ICD-10-CM    1. Cough R05 XR Chest 2 Views     albuterol (PROAIR HFA) 108 (90 BASE) MCG/ACT Inhaler     guaiFENesin-codeine (ROBITUSSIN AC) 100-10 MG/5ML SOLN solution   2. Nasal congestion R09.81    3. Tobacco use disorder F17.200 XR Chest 2 Views     Quit tobacco  Fluids/Rest, f/u if worse/not any better

## 2017-10-25 NOTE — NURSING NOTE
"Chief Complaint   Patient presents with     Cough     cough x 1 week       Initial /72  Pulse 84  Temp 97.9  F (36.6  C) (Oral)  Resp 16  Wt 183 lb 1.6 oz (83.1 kg)  SpO2 96%  BMI 26.27 kg/m2 Estimated body mass index is 26.27 kg/(m^2) as calculated from the following:    Height as of 3/6/17: 5' 10\" (1.778 m).    Weight as of this encounter: 183 lb 1.6 oz (83.1 kg).  Medication Reconciliation: complete    "

## 2018-05-23 NOTE — PROGRESS NOTES
SUBJECTIVE:   CC: Bezabeh Assefa is an 63 year old male who presents for preventative health visit.     Healthy Habits:    Do you get at least three servings of calcium containing foods daily (dairy, green leafy vegetables, etc.)? yes    Amount of exercise or daily activities, outside of work: 4 day(s) per week    Problems taking medications regularly No    Medication side effects: No    Have you had an eye exam in the past two years? yes    Do you see a dentist twice per year? yes    Do you have sleep apnea, excessive snoring or daytime drowsiness?yes- snores        Hyperlipidemia Follow-Up      Rate your low fat/cholesterol diet?: good    Taking statin?  Yes, no muscle aches from statin    Other lipid medications/supplements?:  none    Hypertension Follow-up      Outpatient blood pressures are not being checked.    Low Salt Diet: no added salt      Today's PHQ-2 Score:   PHQ-2 ( 1999 Pfizer) 5/25/2018 3/6/2017   Q1: Little interest or pleasure in doing things 0 0   Q2: Feeling down, depressed or hopeless 0 0   PHQ-2 Score 0 0       Abuse: Current or Past(Physical, Sexual or Emotional)- No  Do you feel safe in your environment - Yes    Social History   Substance Use Topics     Smoking status: Current Every Day Smoker     Packs/day: 0.50     Years: 30.00     Types: Cigarettes     Smokeless tobacco: Never Used     Alcohol use Yes      Comment: 1 every 3 - 4 months      If you drink alcohol do you typically have >3 drinks per day or >7 drinks per week? No                      Last PSA: No results found for: PSA    Reviewed orders with patient. Reviewed health maintenance and updated orders accordingly - Yes  Labs reviewed in EPIC    Reviewed and updated as needed this visit by clinical staff  Tobacco  Allergies  Meds  Med Hx  Surg Hx  Fam Hx  Soc Hx        Reviewed and updated as needed this visit by Provider        Past Medical History:   Diagnosis Date     History of colonoscopy     Next due 5 years  (11/2017)     Hypertension         ROS:  CONSTITUTIONAL: NEGATIVE for fever, chills, change in weight  INTEGUMENTARY/SKIN: NEGATIVE for worrisome rashes, moles or lesions  EYES: NEGATIVE for vision changes or irritation  ENT: NEGATIVE for ear, mouth and throat problems  RESP: NEGATIVE for significant cough or SOB  CV: NEGATIVE for chest pain, palpitations or peripheral edema  GI: NEGATIVE for nausea, abdominal pain, heartburn, or change in bowel habits   male: negative for dysuria, hematuria, decreased urinary stream, erectile dysfunction, urethral discharge  MUSCULOSKELETAL: NEGATIVE for significant arthralgias or myalgia  NEURO: NEGATIVE for weakness, dizziness or paresthesias  PSYCHIATRIC: NEGATIVE for changes in mood or affect    OBJECTIVE:   /77 (BP Location: Left arm, Patient Position: Sitting, Cuff Size: Adult Regular)  Pulse 90  Temp 98  F (36.7  C) (Oral)  Wt 176 lb 12.8 oz (80.2 kg)  SpO2 95%  BMI 25.37 kg/m2  EXAM:  GENERAL: healthy, alert and no distress  EYES: Eyes grossly normal to inspection, PERRL and conjunctivae and sclerae normal  HENT: ear canals and TM's normal, nose and mouth without ulcers or lesions  NECK: no adenopathy, no asymmetry, masses, or scars and thyroid normal to palpation  RESP: lungs clear to auscultation - no rales, rhonchi or wheezes  CV: regular rate and rhythm, normal S1 S2, no S3 or S4, no murmur, click or rub, no peripheral edema and peripheral pulses strong  ABDOMEN: soft, nontender, no hepatosplenomegaly, no masses and bowel sounds normal   (male): normal male genitalia without lesions or urethral discharge, no hernia  RECTAL (male): normal sphincter tone, no rectal masses, prostate normal size, smooth, nontender without nodules or masses  MS: no gross musculoskeletal defects noted, no edema  SKIN: no suspicious lesions or rashes  NEURO: Normal strength and tone, mentation intact and speech normal  LYMPH: no cervical, supraclavicular, axillary, or inguinal  "adenopathy    ASSESSMENT/PLAN:   1. Routine general medical examination at a health care facility  In good health    2. Hypertension goal BP (blood pressure) < 140/90  Well controlled   - hydrochlorothiazide (HYDRODIURIL) 25 MG tablet; Take 1 tablet (25 mg) by mouth daily  Dispense: 90 tablet; Refill: 3  - lisinopril (PRINIVIL/ZESTRIL) 20 MG tablet; Take 1 tablet (20 mg) by mouth daily  Dispense: 90 tablet; Refill: 3  - Albumin Random Urine Quantitative with Creat Ratio    3. Need for hepatitis C screening test  Low rsik  - Hepatitis C Screen Reflex to HCV RNA Quant and Genotype    4. Screening for HIV (human immunodeficiency virus)  sent    5. Hyperlipidemia LDL goal <130  Well controlled   - simvastatin (ZOCOR) 20 MG tablet; Take 1 tablet (20 mg) by mouth daily  Dispense: 90 tablet; Refill: 3    6. Need for pneumococcal vaccination  done  - PNEUMOCOCCAL VACCINE,ADULT,SQ OR IM  - ADMIN 1st VACCINE    COUNSELING:  Reviewed preventive health counseling, as reflected in patient instructions       Regular exercise       Healthy diet/nutrition       Vision screening       Hearing screening       Immunizations    Vaccinated for: Pneumococcal             HIV screeninx in teen years, 1x in adult years, and at intervals if high risk       Colon cancer screening       Prostate cancer screening       reports that he has been smoking Cigarettes.  He has a 15.00 pack-year smoking history. He has never used smokeless tobacco.    Estimated body mass index is 25.37 kg/(m^2) as calculated from the following:    Height as of 3/6/17: 5' 10\" (1.778 m).    Weight as of this encounter: 176 lb 12.8 oz (80.2 kg).       Counseling Resources:  ATP IV Guidelines  Pooled Cohorts Equation Calculator  FRAX Risk Assessment  ICSI Preventive Guidelines  Dietary Guidelines for Americans,   [employer]'s MyPlate  ASA Prophylaxis  Lung CA Screening    Elias Ballesteros MD  [facility]  "

## 2018-05-25 ENCOUNTER — OFFICE VISIT (OUTPATIENT)
Dept: FAMILY MEDICINE | Facility: CLINIC | Age: 63
End: 2018-05-25
Payer: COMMERCIAL

## 2018-05-25 VITALS
HEART RATE: 90 BPM | OXYGEN SATURATION: 95 % | BODY MASS INDEX: 25.37 KG/M2 | WEIGHT: 176.8 LBS | SYSTOLIC BLOOD PRESSURE: 104 MMHG | DIASTOLIC BLOOD PRESSURE: 77 MMHG | TEMPERATURE: 98 F

## 2018-05-25 DIAGNOSIS — Z11.59 NEED FOR HEPATITIS C SCREENING TEST: ICD-10-CM

## 2018-05-25 DIAGNOSIS — Z23 NEED FOR PNEUMOCOCCAL VACCINATION: ICD-10-CM

## 2018-05-25 DIAGNOSIS — I10 HYPERTENSION GOAL BP (BLOOD PRESSURE) < 140/90: ICD-10-CM

## 2018-05-25 DIAGNOSIS — Z11.4 SCREENING FOR HIV (HUMAN IMMUNODEFICIENCY VIRUS): ICD-10-CM

## 2018-05-25 DIAGNOSIS — Z00.00 ROUTINE GENERAL MEDICAL EXAMINATION AT A HEALTH CARE FACILITY: Primary | ICD-10-CM

## 2018-05-25 DIAGNOSIS — E78.5 HYPERLIPIDEMIA LDL GOAL <130: ICD-10-CM

## 2018-05-25 PROCEDURE — 36415 COLL VENOUS BLD VENIPUNCTURE: CPT | Performed by: FAMILY MEDICINE

## 2018-05-25 PROCEDURE — 90471 IMMUNIZATION ADMIN: CPT | Performed by: FAMILY MEDICINE

## 2018-05-25 PROCEDURE — 86803 HEPATITIS C AB TEST: CPT | Performed by: FAMILY MEDICINE

## 2018-05-25 PROCEDURE — 99396 PREV VISIT EST AGE 40-64: CPT | Mod: 25 | Performed by: FAMILY MEDICINE

## 2018-05-25 PROCEDURE — 90732 PPSV23 VACC 2 YRS+ SUBQ/IM: CPT | Performed by: FAMILY MEDICINE

## 2018-05-25 PROCEDURE — 82043 UR ALBUMIN QUANTITATIVE: CPT | Performed by: FAMILY MEDICINE

## 2018-05-25 RX ORDER — LISINOPRIL 20 MG/1
20 TABLET ORAL DAILY
Qty: 90 TABLET | Refills: 3 | Status: SHIPPED | OUTPATIENT
Start: 2018-05-25 | End: 2019-03-14

## 2018-05-25 RX ORDER — SIMVASTATIN 20 MG
20 TABLET ORAL DAILY
Qty: 90 TABLET | Refills: 3 | Status: SHIPPED | OUTPATIENT
Start: 2018-05-25 | End: 2019-06-20

## 2018-05-25 RX ORDER — HYDROCHLOROTHIAZIDE 25 MG/1
25 TABLET ORAL DAILY
Qty: 90 TABLET | Refills: 3 | Status: SHIPPED | OUTPATIENT
Start: 2018-05-25 | End: 2019-06-20

## 2018-05-25 NOTE — MR AVS SNAPSHOT
After Visit Summary   5/25/2018    Bezabeh Assefa    MRN: [id number]           Patient Information     Date Of Birth          1955        Visit Information        Provider Department      5/25/2018 1:00 PM Elias Ballesteros MD [facility]        Today's Diagnoses     Routine general medical examination at a health care facility    -  1    Hypertension goal BP (blood pressure) < 140/90        Need for hepatitis C screening test        Screening for HIV (human immunodeficiency virus)        Hyperlipidemia LDL goal <130        Need for pneumococcal vaccination          Care Instructions      Preventive Health Recommendations  Male Ages 50 - 64    Yearly exam:             See your health care provider every year in order to  o   Review health changes.   o   Discuss preventive care.    o   Review your medicines if your doctor has prescribed any.     Have a cholesterol test every 5 years, or more frequently if you are at risk for high cholesterol/heart disease.     Have a diabetes test (fasting glucose) every three years. If you are at risk for diabetes, you should have this test more often.     Have a colonoscopy at age 50, or have a yearly FIT test (stool test). These exams will check for colon cancer.      Talk with your health care provider about whether or not a prostate cancer screening test (PSA) is right for you.    You should be tested each year for STDs (sexually transmitted diseases), if you re at risk.     Shots: Get a flu shot each year. Get a tetanus shot every 10 years.     Nutrition:    Eat at least 5 servings of fruits and vegetables daily.     Eat whole-grain bread, whole-wheat pasta and brown rice instead of white grains and rice.     Talk to your provider about Calcium and Vitamin D.     Lifestyle    Exercise for at least 150 minutes a week (30 minutes a day, 5 days a week). This will help you control your weight and prevent disease.     Limit alcohol to one  "drink per day.     No smoking.     Wear sunscreen to prevent skin cancer.     See your dentist every six months for an exam and cleaning.     See your eye doctor every 1 to 2 years.            Follow-ups after your visit        Who to contact     If you have questions or need follow up information about today's clinic visit or your schedule please contact [facility] directly at [phone].  Normal or non-critical lab and imaging results will be communicated to you by MyChart, letter or phone within 4 business days after the clinic has received the results. If you do not hear from us within 7 days, please contact the clinic through Offsite Care Resourceshart or phone. If you have a critical or abnormal lab result, we will notify you by phone as soon as possible.  Submit refill requests through Mavatar or call your pharmacy and they will forward the refill request to us. Please allow 3 business days for your refill to be completed.          Additional Information About Your Visit        Offsite Care ResourcesharImpulseSave Information     Mavatar lets you send messages to your doctor, view your test results, renew your prescriptions, schedule appointments and more. To sign up, go to [email or web address] . Click on \"Log in\" on the left side of the screen, which will take you to the Welcome page. Then click on \"Sign up Now\" on the right side of the page.     You will be asked to enter the access code listed below, as well as some personal information. Please follow the directions to create your username and password.     Your access code is: [email or web address]  Expires: 2018 12:43 PM     Your access code will  in 90 days. If you need help or a new code, please call your [facility] [place] [facility] or [phone].        Care EveryWhere ID     This is your Care EveryWhere ID. This could be used by other organizations to access your [facility] medical records  [id number]        Your Vitals Were     Pulse Temperature Pulse Oximetry BMI (Body Mass Index) "          90 98  F (36.7  C) (Oral) 95% 25.37 kg/m2         Blood Pressure from Last 3 Encounters:   05/25/18 104/77   10/25/17 122/72   10/05/17 127/87    Weight from Last 3 Encounters:   05/25/18 176 lb 12.8 oz (80.2 kg)   10/25/17 183 lb 1.6 oz (83.1 kg)   09/06/17 182 lb 8 oz (82.8 kg)              We Performed the Following     ADMIN 1st VACCINE     Albumin Random Urine Quantitative with Creat Ratio     Hepatitis C Screen Reflex to HCV RNA Quant and Genotype     PNEUMOCOCCAL VACCINE,ADULT,SQ OR IM          Today's Medication Changes          These changes are accurate as of 5/25/18  1:27 PM.  If you have any questions, ask your nurse or doctor.               Stop taking these medicines if you haven't already. Please contact your care team if you have questions.     albuterol 108 (90 Base) MCG/ACT Inhaler   Commonly known as:  PROAIR HFA   Stopped by:  Elias Ballesteros MD                Where to get your medicines      These medications were sent to [id number] - [place] [id number]     Phone:  [phone]     hydrochlorothiazide 25 MG tablet    lisinopril 20 MG tablet    simvastatin 20 MG tablet                Primary Care Provider Office Phone # Fax #    Vickie Barone [phone]       [facility] [place]  [address] [facility] [address]        Equal Access to Services     COLLETTE ENGLISH AH: Hadii aad ku hadasho Soomaali, waaxda luqadaha, qaybta kaalmada adeegyada, waxay dionna silverman . So [facility] [phone].    ATENCIÓN: Si habla español, tiene a giles disposición servicios gratuitos de asistencia lingüística. Aneesh al [phone].    We comply with applicable federal civil rights laws and [place] laws. We do not discriminate on the basis of race, color, national origin, age, disability, sex, sexual orientation, or gender identity.            Thank you!     Thank you for choosing  [facility]  for your care. Our goal is always to provide you with excellent care. Hearing back from our patients is one way we can continue to improve our services. Please take a few minutes to complete the written survey that you may receive in the mail after your visit with us. Thank you!             Your Updated Medication List - Protect others around you: Learn how to safely use, store and throw away your medicines at www.disposemymeds.org.          This list is accurate as of 5/25/18  1:27 PM.  Always use your most recent med list.                   Brand Name Dispense Instructions for use Diagnosis    atovaquone-proguanil 250-100 MG per tablet    MALARONE    45 tablet    Take 1 tablet by mouth daily Start 2 days before exposure to Malaria and continue daily till  7 days after exposure.    Travel advice encounter       hydrochlorothiazide 25 MG tablet    HYDRODIURIL    90 tablet    Take 1 tablet (25 mg) by mouth daily    Hypertension goal BP (blood pressure) < 140/90       lisinopril 20 MG tablet    PRINIVIL/ZESTRIL    90 tablet    Take 1 tablet (20 mg) by mouth daily    Hypertension goal BP (blood pressure) < 140/90       simvastatin 20 MG tablet    ZOCOR    90 tablet    Take 1 tablet (20 mg) by mouth daily    Hyperlipidemia LDL goal <130       vitamin D 2000 units tablet     100 tablet    Take 2,000 Units by mouth daily.    Vitamin D deficiency

## 2018-05-26 LAB
CREAT UR-MCNC: 111 MG/DL
HCV AB SERPL QL IA: NONREACTIVE
MICROALBUMIN UR-MCNC: 5 MG/L
MICROALBUMIN/CREAT UR: 4.9 MG/G CR (ref 0–17)

## 2019-03-14 DIAGNOSIS — I10 HYPERTENSION GOAL BP (BLOOD PRESSURE) < 140/90: ICD-10-CM

## 2019-03-14 RX ORDER — LISINOPRIL 20 MG/1
20 TABLET ORAL DAILY
Qty: 90 TABLET | Refills: 0 | Status: SHIPPED | OUTPATIENT
Start: 2019-03-14 | End: 2019-06-20

## 2019-03-14 NOTE — TELEPHONE ENCOUNTER
"Requested Prescriptions   Pending Prescriptions Disp Refills     lisinopril (PRINIVIL/ZESTRIL) 20 MG tablet 90 tablet 3    Last Written Prescription Date:  05/25/2018  Last Fill Quantity: 90,  # refills: 3   Last office visit: 5/25/2018 with prescribing provider:  05/25/2018   Future Office Visit:   Sig: Take 1 tablet (20 mg) by mouth daily    ACE Inhibitors (Including Combos) Protocol Failed - 3/14/2019 10:32 AM       Failed - Normal serum creatinine on file in past 12 months    Recent Labs   Lab Test 03/06/17  1134   CR 0.92            Failed - Normal serum potassium on file in past 12 months    Recent Labs   Lab Test 03/06/17  1134   POTASSIUM 3.5            Passed - Blood pressure under 140/90 in past 12 months    BP Readings from Last 3 Encounters:   05/25/18 104/77   10/25/17 122/72   10/05/17 127/87                Passed - Recent (12 mo) or future (30 days) visit within the authorizing provider's specialty    Patient had office visit in the last 12 months or has a visit in the next 30 days with authorizing provider or within the authorizing provider's specialty.  See \"Patient Info\" tab in inbasket, or \"Choose Columns\" in Meds & Orders section of the refill encounter.             Passed - Medication is active on med list       Passed - Patient is age 18 or older        "

## 2019-03-14 NOTE — TELEPHONE ENCOUNTER
Dr. Ballesteros,  Please review/sign or advise for refill request of: lisinopril (PRINIVIL/ZESTRIL) 20 MG tablet    Routing refill request to provider for review/approval because:  Labs not current:  Creat, Potassium -- patient spilled meds and needs early refill    LOV: 05/25/2018    Thank You!  Maral Arellano, RN  Triage Nurse

## 2019-03-15 DIAGNOSIS — I10 HYPERTENSION GOAL BP (BLOOD PRESSURE) < 140/90: ICD-10-CM

## 2019-03-18 RX ORDER — LISINOPRIL 20 MG/1
TABLET ORAL
Qty: 90 TABLET | Refills: 2 | OUTPATIENT
Start: 2019-03-18

## 2019-03-18 NOTE — TELEPHONE ENCOUNTER
"Requested Prescriptions   Pending Prescriptions Disp Refills     lisinopril (PRINIVIL/ZESTRIL) 20 MG tablet [Pharmacy Med Name: Lisinopril Oral Tablet 20 MG] 90 tablet 2    Last Written Prescription Date:  03/14/2019  Last Fill Quantity: 90,  # refills: 0   Last office visit: 5/25/2018 with prescribing provider:  5/25/2018   Future Office Visit:   Sig: TAKE ONE TABLET BY MOUTH ONE TIME DAILY    ACE Inhibitors (Including Combos) Protocol Failed - 3/15/2019  9:41 PM       Failed - Normal serum creatinine on file in past 12 months    Recent Labs   Lab Test 03/06/17  1134   CR 0.92            Failed - Normal serum potassium on file in past 12 months    Recent Labs   Lab Test 03/06/17  1134   POTASSIUM 3.5            Passed - Blood pressure under 140/90 in past 12 months    BP Readings from Last 3 Encounters:   05/25/18 104/77   10/25/17 122/72   10/05/17 127/87                Passed - Recent (12 mo) or future (30 days) visit within the authorizing provider's specialty    Patient had office visit in the last 12 months or has a visit in the next 30 days with authorizing provider or within the authorizing provider's specialty.  See \"Patient Info\" tab in inbasket, or \"Choose Columns\" in Meds & Orders section of the refill encounter.             Passed - Medication is active on med list       Passed - Patient is age 18 or older        "

## 2019-03-18 NOTE — TELEPHONE ENCOUNTER
Writer notes prescription sent 03/14/2019 #90/0 refills    Medication refused with note to pharmacy    Maral Arellano, RN  Triage Nurse

## 2019-06-18 ASSESSMENT — ENCOUNTER SYMPTOMS
PALPITATIONS: 0
NAUSEA: 0
PARESTHESIAS: 0
DIARRHEA: 0
ARTHRALGIAS: 0
FEVER: 0
WEAKNESS: 0
SHORTNESS OF BREATH: 0
COUGH: 0
HEADACHES: 0
EYE PAIN: 0
CONSTIPATION: 0
CHILLS: 0
FREQUENCY: 0
HEMATURIA: 0
HEARTBURN: 0
ABDOMINAL PAIN: 0
SORE THROAT: 0
NERVOUS/ANXIOUS: 0
HEMATOCHEZIA: 0
JOINT SWELLING: 0
DYSURIA: 0
MYALGIAS: 0
DIZZINESS: 0

## 2019-06-18 NOTE — PROGRESS NOTES
"  SUBJECTIVE:   CC: Bezabeh Assefa is an 64 year old male who presents for preventive health visit.     Healthy Habits:    Do you get at least three servings of calcium containing foods daily (dairy, green leafy vegetables, etc.)? { :953179::\"yes\"}    Amount of exercise or daily activities, outside of work: { :906679}    Problems taking medications regularly { :767991::\"No\"}    Medication side effects: { :528844::\"No\"}    Have you had an eye exam in the past two years? { :894527}    Do you see a dentist twice per year? { :835162}    Do you have sleep apnea, excessive snoring or daytime drowsiness?{ :976081}  {Outside tests to abstract? :387157}    {additional problems to add (Optional):233149}    Today's PHQ-2 Score:   PHQ-2 ( 1999 Pfizer) 5/25/2018 3/6/2017   Q1: Little interest or pleasure in doing things 0 0   Q2: Feeling down, depressed or hopeless 0 0   PHQ-2 Score 0 0     {PHQ-2 LOOK IN ASSESSMENTS (Optional) :905830}  Abuse: Current or Past(Physical, Sexual or Emotional)- {YES/NO/NA:312977}  Do you feel safe in your environment? {YES/NO/NA:278034}    Social History     Tobacco Use     Smoking status: Current Every Day Smoker     Packs/day: 0.50     Years: 30.00     Pack years: 15.00     Types: Cigarettes     Smokeless tobacco: Never Used   Substance Use Topics     Alcohol use: Yes     Comment: 1 every 3 - 4 months     If you drink alcohol do you typically have >3 drinks per day or >7 drinks per week? {ETOH :144762}                      Last PSA: No results found for: PSA    Reviewed orders with patient. Reviewed health maintenance and updated orders accordingly - {Yes/No:297034::\"Yes\"}  {Chronicprobdata (Optional):794953}    Reviewed and updated as needed this visit by clinical staff         Reviewed and updated as needed this visit by Provider        {HISTORY OPTIONS (Optional):079710}    ROS:  { :209236::\"CONSTITUTIONAL: NEGATIVE for fever, chills, change in weight\",\"INTEGUMENTARY/SKIN: NEGATIVE for " "worrisome rashes, moles or lesions\",\"EYES: NEGATIVE for vision changes or irritation\",\"ENT: NEGATIVE for ear, mouth and throat problems\",\"RESP: NEGATIVE for significant cough or SOB\",\"CV: NEGATIVE for chest pain, palpitations or peripheral edema\",\"GI: NEGATIVE for nausea, abdominal pain, heartburn, or change in bowel habits\",\" male: negative for dysuria, hematuria, decreased urinary stream, erectile dysfunction, urethral discharge\",\"MUSCULOSKELETAL: NEGATIVE for significant arthralgias or myalgia\",\"NEURO: NEGATIVE for weakness, dizziness or paresthesias\",\"PSYCHIATRIC: NEGATIVE for changes in mood or affect\"}    OBJECTIVE:   There were no vitals taken for this visit.  EXAM:  {Exam Choices:216360}    {Diagnostic Test Results (Optional):986645::\"Diagnostic Test Results:\",\"Labs reviewed in Epic\"}    ASSESSMENT/PLAN:   {Diag Picklist:334234}    COUNSELING:  {MALE COUNSELING MESSAGES:369284::\"Reviewed preventive health counseling, as reflected in patient instructions\"}    Estimated body mass index is 25.37 kg/m  as calculated from the following:    Height as of 3/6/17: 1.778 m (5' 10\").    Weight as of 5/25/18: 80.2 kg (176 lb 12.8 oz).    {Weight Management Plan (ACO) Complete if BMI is abnormal-  Ages 18-64  BMI >24.9.  Age 65+ with BMI <23 or >30 (Optional):379310}     reports that he has been smoking cigarettes.  He has a 15.00 pack-year smoking history. He has never used smokeless tobacco.  {Tobacco Cessation -- Complete if patient is a smoker (Optional):407153}    Counseling Resources:  ATP IV Guidelines  Pooled Cohorts Equation Calculator  FRAX Risk Assessment  ICSI Preventive Guidelines  Dietary Guidelines for Americans, 2010  USDA's MyPlate  ASA Prophylaxis  Lung CA Screening    Elias Ballesteros MD  [facility]  "

## 2019-06-20 ENCOUNTER — OFFICE VISIT (OUTPATIENT)
Dept: FAMILY MEDICINE | Facility: CLINIC | Age: 64
End: 2019-06-20
Payer: COMMERCIAL

## 2019-06-20 VITALS
HEIGHT: 70 IN | DIASTOLIC BLOOD PRESSURE: 79 MMHG | SYSTOLIC BLOOD PRESSURE: 102 MMHG | WEIGHT: 173.9 LBS | HEART RATE: 85 BPM | OXYGEN SATURATION: 98 % | TEMPERATURE: 97.9 F | BODY MASS INDEX: 24.9 KG/M2

## 2019-06-20 DIAGNOSIS — Z12.5 SCREENING PSA (PROSTATE SPECIFIC ANTIGEN): ICD-10-CM

## 2019-06-20 DIAGNOSIS — E78.5 HYPERLIPIDEMIA LDL GOAL <130: ICD-10-CM

## 2019-06-20 DIAGNOSIS — Z00.00 ROUTINE GENERAL MEDICAL EXAMINATION AT A HEALTH CARE FACILITY: Primary | ICD-10-CM

## 2019-06-20 DIAGNOSIS — N52.01 ERECTILE DYSFUNCTION DUE TO ARTERIAL INSUFFICIENCY: ICD-10-CM

## 2019-06-20 DIAGNOSIS — I10 HYPERTENSION GOAL BP (BLOOD PRESSURE) < 140/90: ICD-10-CM

## 2019-06-20 PROCEDURE — 80053 COMPREHEN METABOLIC PANEL: CPT | Performed by: FAMILY MEDICINE

## 2019-06-20 PROCEDURE — 36415 COLL VENOUS BLD VENIPUNCTURE: CPT | Performed by: FAMILY MEDICINE

## 2019-06-20 PROCEDURE — 99396 PREV VISIT EST AGE 40-64: CPT | Performed by: FAMILY MEDICINE

## 2019-06-20 PROCEDURE — 80061 LIPID PANEL: CPT | Performed by: FAMILY MEDICINE

## 2019-06-20 PROCEDURE — G0103 PSA SCREENING: HCPCS | Performed by: FAMILY MEDICINE

## 2019-06-20 PROCEDURE — 82043 UR ALBUMIN QUANTITATIVE: CPT | Performed by: FAMILY MEDICINE

## 2019-06-20 RX ORDER — SILDENAFIL 100 MG/1
100 TABLET, FILM COATED ORAL DAILY PRN
Qty: 5 TABLET | Refills: 3 | Status: SHIPPED | OUTPATIENT
Start: 2019-06-20 | End: 2019-10-28

## 2019-06-20 RX ORDER — LISINOPRIL 20 MG/1
20 TABLET ORAL DAILY
Qty: 90 TABLET | Refills: 3 | Status: SHIPPED | OUTPATIENT
Start: 2019-06-20 | End: 2020-07-24

## 2019-06-20 RX ORDER — SIMVASTATIN 20 MG
20 TABLET ORAL DAILY
Qty: 90 TABLET | Refills: 3 | Status: SHIPPED | OUTPATIENT
Start: 2019-06-20 | End: 2020-07-24

## 2019-06-20 RX ORDER — HYDROCHLOROTHIAZIDE 25 MG/1
25 TABLET ORAL DAILY
Qty: 90 TABLET | Refills: 3 | Status: SHIPPED | OUTPATIENT
Start: 2019-06-20 | End: 2020-07-24

## 2019-06-20 ASSESSMENT — MIFFLIN-ST. JEOR: SCORE: 1590.06

## 2019-06-20 NOTE — PROGRESS NOTES
SUBJECTIVE:   CC: Bezabeh Assefa is an 64 year old woman who presents for preventive health visit.     Healthy Habits:     Getting at least 3 servings of Calcium per day:  Yes    Bi-annual eye exam:  Yes    Dental care twice a year:  NO    Sleep apnea or symptoms of sleep apnea:  None    Diet:  Regular (no restrictions)    Frequency of exercise:  2-3 days/week    Duration of exercise:  15-30 minutes    Taking medications regularly:  Yes    Medication side effects:  None    PHQ-2 Total Score: 0    Additional concerns today:  Yes        Today's PHQ-2 Score:   PHQ-2 ( 1999 Pfizer) 6/18/2019   Q1: Little interest or pleasure in doing things 0   Q2: Feeling down, depressed or hopeless 0   PHQ-2 Score 0   Q1: Little interest or pleasure in doing things Not at all   Q2: Feeling down, depressed or hopeless Not at all   PHQ-2 Score 0       Abuse: Current or Past(Physical, Sexual or Emotional)- No  Do you feel safe in your environment? Yes    Social History     Tobacco Use     Smoking status: Current Every Day Smoker     Packs/day: 0.50     Years: 30.00     Pack years: 15.00     Types: Cigarettes     Smokeless tobacco: Never Used   Substance Use Topics     Alcohol use: Yes     Comment: 1 every 3 - 4 months       No flowsheet data found.    Reviewed orders with patient.  Reviewed health maintenance and updated orders accordingly - Yes  Lab work is in process      Having ED would like to try viagra, no contraindication        Reviewed and updated as needed this visit by clinical staff  Tobacco  Allergies  Meds         Reviewed and updated as needed this visit by Provider            Review of Systems  CONSTITUTIONAL: NEGATIVE for fever, chills, change in weight  INTEGUMENTARY/SKIN: NEGATIVE for worrisome rashes, moles or lesions  EYES: NEGATIVE for vision changes or irritation  ENT: NEGATIVE for ear, mouth and throat problems  RESP: NEGATIVE for significant cough or SOB  CV: NEGATIVE for chest pain, palpitations or  "peripheral edema  GI: NEGATIVE for nausea, abdominal pain, heartburn, or change in bowel habits  MUSCULOSKELETAL: NEGATIVE for significant arthralgias or myalgia  NEURO: NEGATIVE for weakness, dizziness or paresthesias  PSYCHIATRIC: NEGATIVE for changes in mood or affect      OBJECTIVE:   /79   Pulse 85   Temp 97.9  F (36.6  C) (Oral)   Ht 1.778 m (5' 10\")   Wt 78.9 kg (173 lb 14.4 oz)   SpO2 98%   BMI 24.95 kg/m    Physical Exam  GENERAL: healthy, alert and no distress  EYES: Eyes grossly normal to inspection, PERRL and conjunctivae and sclerae normal  HENT: ear canals and TM's normal, nose and mouth without ulcers or lesions  NECK: no adenopathy, no asymmetry, masses, or scars and thyroid normal to palpation  RESP: lungs clear to auscultation - no rales, rhonchi or wheezes  CV: regular rate and rhythm, normal S1 S2, no S3 or S4, no murmur, click or rub, no peripheral edema and peripheral pulses strong  ABDOMEN: soft, nontender, no hepatosplenomegaly, no masses and bowel sounds normal   (male): normal male genitalia without lesions or urethral discharge, no hernia  RECTAL (male): normal sphincter tone, no rectal masses, prostate normal size, smooth, nontender without nodules or masses  MS: no gross musculoskeletal defects noted, no edema  SKIN: no suspicious lesions or rashes  NEURO: Normal strength and tone, mentation intact and speech normal  PSYCH: mentation appears normal, affect normal/bright  LYMPH: no cervical, supraclavicular, axillary, or inguinal adenopathy    Diagnostic Test Results:  Labs reviewed in Epic    ASSESSMENT/PLAN:   1. Routine general medical examination at a health care facility  In good health    2. Hypertension goal BP (blood pressure) < 140/90  Well controlled   - Albumin Random Urine Quantitative with Creat Ratio  - Comprehensive metabolic panel  - Lipid Profile  - hydrochlorothiazide (HYDRODIURIL) 25 MG tablet; Take 1 tablet (25 mg) by mouth daily  Dispense: 90 tablet; " "Refill: 3  - lisinopril (PRINIVIL/ZESTRIL) 20 MG tablet; Take 1 tablet (20 mg) by mouth daily  Dispense: 90 tablet; Refill: 3    3. Hyperlipidemia LDL goal <130  Well controlled   - Lipid Profile  - simvastatin (ZOCOR) 20 MG tablet; Take 1 tablet (20 mg) by mouth daily  Dispense: 90 tablet; Refill: 3    4. Screening PSA (prostate specific antigen)  sent  - PSA, screen    5. Erectile dysfunction due to arterial insufficiency  try  - sildenafil (VIAGRA) 100 MG tablet; Take 1 tablet (100 mg) by mouth daily as needed (ED)  Dispense: 5 tablet; Refill: 3    COUNSELING:  Reviewed preventive health counseling, as reflected in patient instructions       Regular exercise       Healthy diet/nutrition       Vision screening       Hearing screening       Safe sex practices/STD prevention       Colon cancer screening    Estimated body mass index is 24.95 kg/m  as calculated from the following:    Height as of this encounter: 1.778 m (5' 10\").    Weight as of this encounter: 78.9 kg (173 lb 14.4 oz).         reports that he has been smoking cigarettes.  He has a 15.00 pack-year smoking history. He has never used smokeless tobacco.      Counseling Resources:  ATP IV Guidelines  Pooled Cohorts Equation Calculator  Breast Cancer Risk Calculator  FRAX Risk Assessment  ICSI Preventive Guidelines  Dietary Guidelines for Americans, 2010  USDA's MyPlate  ASA Prophylaxis  Lung CA Screening    Elias Ballesteros MD  [facility] [place]  "

## 2019-06-20 NOTE — LETTER
June 21, 2019      Bezabeh Assefa  [address] [place] [address]   [facility] [id number]        Dear ,    We are writing to inform you of your test results.    Your test results fall within the expected range(s) or remain unchanged from previous results.  Please continue with current treatment plan.    Resulted Orders   Albumin Random Urine Quantitative with Creat Ratio   Result Value Ref Range    Creatinine Urine 337 mg/dL    Albumin Urine mg/L 28 mg/L    Albumin Urine mg/g Cr 8.34 0 - 17 mg/g Cr   Comprehensive metabolic panel   Result Value Ref Range    Sodium 136 133 - 144 mmol/L    Potassium 3.7 3.4 - 5.3 mmol/L    Chloride 102 94 - 109 mmol/L    Carbon Dioxide 27 20 - 32 mmol/L    Anion Gap 7 3 - 14 mmol/L    Glucose 91 70 - 99 mg/dL      Comment:      Fasting specimen    Urea Nitrogen 12 7 - 30 mg/dL    Creatinine 0.95 0.66 - 1.25 mg/dL    GFR Estimate 84 >60 mL/min/[1.73_m2]      Comment:      Non  GFR Calc  Starting 12/18/2018, serum creatinine based estimated GFR (eGFR) will be   calculated using the Chronic Kidney Disease Epidemiology Collaboration   (CKD-EPI) equation.      GFR Estimate If Black >90 >60 mL/min/[1.73_m2]      Comment:       GFR Calc  Starting 12/18/2018, serum creatinine based estimated GFR (eGFR) will be   calculated using the Chronic Kidney Disease Epidemiology Collaboration   (CKD-EPI) equation.      Calcium 9.4 8.5 - 10.1 mg/dL    Bilirubin Total 0.6 0.2 - 1.3 mg/dL    Albumin 4.2 3.4 - 5.0 g/dL    Protein Total 7.8 6.8 - 8.8 g/dL    Alkaline Phosphatase 68 40 - 150 U/L    ALT 36 0 - 70 U/L    AST 27 0 - 45 U/L   Lipid Profile   Result Value Ref Range    Cholesterol 144 <200 mg/dL    Triglycerides 144 <150 mg/dL      Comment:      Fasting specimen    HDL Cholesterol 37 (L) >39 mg/dL    LDL Cholesterol Calculated 78 <100 mg/dL      Comment:      Desirable:       <100 mg/dl    Non HDL Cholesterol 107 <130 mg/dL   PSA, screen   Result Value Ref  Range    PSA 0.56 0 - 4 ug/L      Comment:      Assay Method:  Chemiluminescence using Siemens Vista analyzer       If you have any questions or concerns, please call the clinic at the number listed above.       Sincerely,  Elias Ballesteros MD

## 2019-06-21 LAB
ALBUMIN SERPL-MCNC: 4.2 G/DL (ref 3.4–5)
ALP SERPL-CCNC: 68 U/L (ref 40–150)
ALT SERPL W P-5'-P-CCNC: 36 U/L (ref 0–70)
ANION GAP SERPL CALCULATED.3IONS-SCNC: 7 MMOL/L (ref 3–14)
AST SERPL W P-5'-P-CCNC: 27 U/L (ref 0–45)
BILIRUB SERPL-MCNC: 0.6 MG/DL (ref 0.2–1.3)
BUN SERPL-MCNC: 12 MG/DL (ref 7–30)
CALCIUM SERPL-MCNC: 9.4 MG/DL (ref 8.5–10.1)
CHLORIDE SERPL-SCNC: 102 MMOL/L (ref 94–109)
CHOLEST SERPL-MCNC: 144 MG/DL
CO2 SERPL-SCNC: 27 MMOL/L (ref 20–32)
CREAT SERPL-MCNC: 0.95 MG/DL (ref 0.66–1.25)
CREAT UR-MCNC: 337 MG/DL
GFR SERPL CREATININE-BSD FRML MDRD: 84 ML/MIN/{1.73_M2}
GLUCOSE SERPL-MCNC: 91 MG/DL (ref 70–99)
HDLC SERPL-MCNC: 37 MG/DL
LDLC SERPL CALC-MCNC: 78 MG/DL
MICROALBUMIN UR-MCNC: 28 MG/L
MICROALBUMIN/CREAT UR: 8.34 MG/G CR (ref 0–17)
NONHDLC SERPL-MCNC: 107 MG/DL
POTASSIUM SERPL-SCNC: 3.7 MMOL/L (ref 3.4–5.3)
PROT SERPL-MCNC: 7.8 G/DL (ref 6.8–8.8)
PSA SERPL-ACNC: 0.56 UG/L (ref 0–4)
SODIUM SERPL-SCNC: 136 MMOL/L (ref 133–144)
TRIGL SERPL-MCNC: 144 MG/DL

## 2019-08-20 ENCOUNTER — OFFICE VISIT (OUTPATIENT)
Dept: URGENT CARE | Facility: URGENT CARE | Age: 64
End: 2019-08-20
Payer: COMMERCIAL

## 2019-08-20 VITALS
TEMPERATURE: 96.9 F | WEIGHT: 178.38 LBS | HEART RATE: 73 BPM | SYSTOLIC BLOOD PRESSURE: 119 MMHG | DIASTOLIC BLOOD PRESSURE: 82 MMHG | BODY MASS INDEX: 25.59 KG/M2

## 2019-08-20 DIAGNOSIS — H61.23 BILATERAL IMPACTED CERUMEN: Primary | ICD-10-CM

## 2019-08-20 PROCEDURE — 69209 REMOVE IMPACTED EAR WAX UNI: CPT | Mod: 50 | Performed by: PHYSICIAN ASSISTANT

## 2019-08-21 NOTE — PROGRESS NOTES
Patient presents with:  Urgent Care: plugged ears.    SUBJECTIVE:  Bezabeh Assefa is a 64 year old male who presents with bilateral ear fullness for 2 day(s).   Severity: moderate   Timing:sudden onset  Additional symptoms include none.      History of recurrent otitis: no    Past Medical History:   Diagnosis Date     History of colonoscopy     Next due 5 years (11/2017)     Hypertension      Current Outpatient Medications   Medication Sig Dispense Refill     hydrochlorothiazide (HYDRODIURIL) 25 MG tablet Take 1 tablet (25 mg) by mouth daily 90 tablet 3     lisinopril (PRINIVIL/ZESTRIL) 20 MG tablet Take 1 tablet (20 mg) by mouth daily 90 tablet 3     sildenafil (VIAGRA) 100 MG tablet Take 1 tablet (100 mg) by mouth daily as needed (ED) 5 tablet 3     simvastatin (ZOCOR) 20 MG tablet Take 1 tablet (20 mg) by mouth daily 90 tablet 3     Social History     Tobacco Use     Smoking status: Current Every Day Smoker     Packs/day: 0.50     Years: 30.00     Pack years: 15.00     Types: Cigarettes     Smokeless tobacco: Never Used   Substance Use Topics     Alcohol use: Yes     Comment: 1 every 3 - 4 months       ROS:   CONSTITUTIONAL:NEGATIVE for fever, chills, change in weight  INTEGUMENTARY/SKIN: NEGATIVE for worrisome rashes, moles or lesions  EYES: NEGATIVE for vision changes or irritation  ENT/MOUTH: as per HPI  RESP:NEGATIVE for significant cough or SOB  CV: NEGATIVE for chest pain, palpitations or peripheral edema    OBJECTIVE:  /82   Pulse 73   Temp 96.9  F (36.1  C) (Tympanic)   Wt 80.9 kg (178 lb 6 oz)   BMI 25.59 kg/m     EXAM:  Both ears are filled with soft brown cerumen prior to ear lavage by nursing.    After the lavage the exam is as follows:  The right TM is normal: no effusions, no erythema, and normal landmarks     The right auditory canal is normal and without drainage, edema or erythema  The left TM is normal: no effusions, no erythema, and normal landmarks  The left auditory canal is normal  and without drainage, edema or erythema      (H61.23) Bilateral impacted cerumen  (primary encounter diagnosis)  Comment:   Plan: HC REMOVAL IMPACTED CERUMEN IRRIGATION/LVG         UNILAT        Resolved.

## 2019-10-24 ENCOUNTER — OFFICE VISIT (OUTPATIENT)
Dept: FAMILY MEDICINE | Facility: CLINIC | Age: 64
End: 2019-10-24
Payer: COMMERCIAL

## 2019-10-24 VITALS
BODY MASS INDEX: 25.59 KG/M2 | TEMPERATURE: 98.8 F | HEIGHT: 70 IN | DIASTOLIC BLOOD PRESSURE: 75 MMHG | HEART RATE: 93 BPM | SYSTOLIC BLOOD PRESSURE: 108 MMHG

## 2019-10-24 DIAGNOSIS — Z71.84 TRAVEL ADVICE ENCOUNTER: Primary | ICD-10-CM

## 2019-10-24 PROCEDURE — 36415 COLL VENOUS BLD VENIPUNCTURE: CPT | Performed by: NURSE PRACTITIONER

## 2019-10-24 PROCEDURE — 90691 TYPHOID VACCINE IM: CPT | Mod: GA | Performed by: NURSE PRACTITIONER

## 2019-10-24 PROCEDURE — 87340 HEPATITIS B SURFACE AG IA: CPT | Performed by: NURSE PRACTITIONER

## 2019-10-24 PROCEDURE — 90471 IMMUNIZATION ADMIN: CPT | Mod: GA | Performed by: NURSE PRACTITIONER

## 2019-10-24 PROCEDURE — 90472 IMMUNIZATION ADMIN EACH ADD: CPT | Mod: GA | Performed by: NURSE PRACTITIONER

## 2019-10-24 PROCEDURE — 86706 HEP B SURFACE ANTIBODY: CPT | Performed by: NURSE PRACTITIONER

## 2019-10-24 PROCEDURE — 90632 HEPA VACCINE ADULT IM: CPT | Mod: GA | Performed by: NURSE PRACTITIONER

## 2019-10-24 PROCEDURE — 86481 TB AG RESPONSE T-CELL SUSP: CPT | Performed by: NURSE PRACTITIONER

## 2019-10-24 PROCEDURE — 99402 PREV MED CNSL INDIV APPRX 30: CPT | Mod: 25 | Performed by: NURSE PRACTITIONER

## 2019-10-24 RX ORDER — ATOVAQUONE AND PROGUANIL HYDROCHLORIDE 250; 100 MG/1; MG/1
1 TABLET, FILM COATED ORAL DAILY
Qty: 30 TABLET | Refills: 0 | Status: SHIPPED | OUTPATIENT
Start: 2019-10-24 | End: 2020-09-03

## 2019-10-24 RX ORDER — AZITHROMYCIN 500 MG/1
500 TABLET, FILM COATED ORAL DAILY
Qty: 3 TABLET | Refills: 0 | Status: SHIPPED | OUTPATIENT
Start: 2019-10-24 | End: 2019-10-27

## 2019-10-24 NOTE — PROGRESS NOTES
"Nurse Note      Itinerary:  [facility]       Departure Date: 11/23/2019      Return Date: 12/14/2019      Length of Trip 3 weeks      Reason for Travel: Pleasure            Urban or rural: Both      Accommodations: Family home        IMMUNIZATION HISTORY  Have you received any immunizations within the past 4 weeks?  No  Have you ever fainted from having your blood drawn or from an injection?  No  Have you ever had a fever reaction to vaccination?  No  Have you ever had any bad reaction or side effect from any vaccination?  No  Have you ever had hepatitis A or B vaccine?  Yes  Do you live (or work closely) with anyone who has AIDS, an AIDS-like condition, any other immune disorder or who is on chemotherapy for cancer?  No  Do you have a family history of immunodeficiency?  No  Have you received any injection of immune globulin or any blood products during the past 12 months?  No    Patient roomed by GIANNA Gonzalez  Bezabeh Assefa is a 64 year old male seen today alone for counsultation for international travel to the stated countries.   Patient will be departing in  1 month(s) and  traveling friend.      Patient itinerary :  will be in the Health system  region of [facility] which presents risk for Malaria and Yellow Fever. exposure.      Patient's activities will include sightseeing and visiting friends and relatives.    Patient's country of birth is [facility], arrival to      Special medical concerns: Blood pressure  Pre-travel questionnaire was completed by patient and reviewed by provider.     Vitals: /75   Pulse 93   Temp 98.8  F (37.1  C) (Oral)   Ht 1.778 m (5' 10\")   BMI 25.59 kg/m    BMI= Body mass index is 25.59 kg/m .    EXAM:  General:  Well-nourished, well-developed in no acute distress.  Appears to be stated age, interacts appropriately and expresses understanding of information given to patient.    Current Outpatient Medications   Medication Sig Dispense Refill     " hydrochlorothiazide (HYDRODIURIL) 25 MG tablet Take 1 tablet (25 mg) by mouth daily 90 tablet 3     lisinopril (PRINIVIL/ZESTRIL) 20 MG tablet Take 1 tablet (20 mg) by mouth daily 90 tablet 3     sildenafil (VIAGRA) 100 MG tablet Take 1 tablet (100 mg) by mouth daily as needed (ED) 5 tablet 3     simvastatin (ZOCOR) 20 MG tablet Take 1 tablet (20 mg) by mouth daily 90 tablet 3     Patient Active Problem List   Diagnosis     Hyperlipidemia LDL goal <130     Tobacco use disorder     Hypertension goal BP (blood pressure) < 140/90     No Known Allergies      Immunizations discussed include:   Hepatitis A:  Ordered/given today, risks, benefits and side effects reviewed  Hepatitis B: Declined  Not concerned about risk of disease  Influenza: Declined  Not concerned about risk of disease  Typhoid: Ordered/given today, risks, benefits and side effects reviewed  Rabies: Declined  reviewed managment of a animal bite or scratch (washing wound, seek medical care within 24 hours for post exposure prophylaxis )  Yellow Fever: Up to date  Japanese Encephalitis: Not indicated  Meningococcus: Declined  Not concerned about risk of disease  Tetanus/Diphtheria: Up to date  Measles/Mumps/Rubella: Up to date  Cholera: Not needed  Polio: Declined  Not concerned about risk of disease  Pneumococcal: Up to date  Varicella: Immune by disease history per patient report  Zostavax:  Not indicated  Shingrix: deferred  HPV:  na  TB:  Low risk    Altitude Exposure on this trip: no  Past tolerance to Altitude: na    ASSESSMENT/PLAN:    ICD-10-CM    1. Travel advice encounter Z71.84 Hepatitis B Surface Antibody     Hepatitis B surface antigen     atovaquone-proguanil (MALARONE) 250-100 MG tablet     azithromycin (ZITHROMAX) 500 MG tablet     Quantiferon TB Gold Plus     I have reviewed general recommendations for safe travel   including: food/water precautions, insect precautions, safer sex   practices given high prevalence of Zika, HIV and other  STDs,   roadway safety. Educational materials and [employer] report provided.    Malaraia prophylaxis recommended: Malarone  Symptomatic treatment for traveler's diarrhea: azithromycin  Altitude illness prevention and treatment: none      Evacuation insurance advised and resources were provided to patient.    Total visit time 30 minutes  with over 50% of time spent counseling patient as detailed above.    Hallie Hernandez CNP

## 2019-10-25 LAB
HBV SURFACE AB SERPL IA-ACNC: 1.09 M[IU]/ML
HBV SURFACE AG SERPL QL IA: NONREACTIVE

## 2019-10-26 DIAGNOSIS — Z23 NEED FOR VACCINATION AGAINST HEPATITIS B VIRUS: Primary | ICD-10-CM

## 2019-10-27 LAB
GAMMA INTERFERON BACKGROUND BLD IA-ACNC: 0.03 IU/ML
M TB IFN-G BLD-IMP: NEGATIVE
M TB IFN-G CD4+ BCKGRND COR BLD-ACNC: 9.88 IU/ML
MITOGEN IGNF BCKGRD COR BLD-ACNC: 0 IU/ML
MITOGEN IGNF BCKGRD COR BLD-ACNC: 0 IU/ML

## 2020-02-10 ENCOUNTER — HEALTH MAINTENANCE LETTER (OUTPATIENT)
Age: 65
End: 2020-02-10

## 2020-07-23 DIAGNOSIS — N52.01 ERECTILE DYSFUNCTION DUE TO ARTERIAL INSUFFICIENCY: ICD-10-CM

## 2020-07-23 DIAGNOSIS — I10 HYPERTENSION GOAL BP (BLOOD PRESSURE) < 140/90: ICD-10-CM

## 2020-07-23 DIAGNOSIS — E78.5 HYPERLIPIDEMIA LDL GOAL <130: ICD-10-CM

## 2020-07-24 RX ORDER — HYDROCHLOROTHIAZIDE 25 MG/1
TABLET ORAL
Qty: 90 TABLET | Refills: 0 | Status: SHIPPED | OUTPATIENT
Start: 2020-07-24 | End: 2020-09-03

## 2020-07-24 RX ORDER — SILDENAFIL 100 MG/1
TABLET, FILM COATED ORAL
Qty: 5 TABLET | Refills: 0 | Status: SHIPPED | OUTPATIENT
Start: 2020-07-24 | End: 2020-09-03

## 2020-07-24 RX ORDER — LISINOPRIL 20 MG/1
TABLET ORAL
Qty: 90 TABLET | Refills: 0 | Status: SHIPPED | OUTPATIENT
Start: 2020-07-24 | End: 2020-09-03

## 2020-07-24 RX ORDER — SIMVASTATIN 20 MG
TABLET ORAL
Qty: 90 TABLET | Refills: 0 | Status: SHIPPED | OUTPATIENT
Start: 2020-07-24 | End: 2020-09-03

## 2020-09-03 ENCOUNTER — OFFICE VISIT (OUTPATIENT)
Dept: FAMILY MEDICINE | Facility: CLINIC | Age: 65
End: 2020-09-03
Payer: COMMERCIAL

## 2020-09-03 VITALS
TEMPERATURE: 97.8 F | DIASTOLIC BLOOD PRESSURE: 80 MMHG | SYSTOLIC BLOOD PRESSURE: 110 MMHG | BODY MASS INDEX: 24.91 KG/M2 | HEART RATE: 91 BPM | WEIGHT: 174 LBS | HEIGHT: 70 IN | OXYGEN SATURATION: 98 %

## 2020-09-03 DIAGNOSIS — Z00.00 ROUTINE GENERAL MEDICAL EXAMINATION AT A HEALTH CARE FACILITY: Primary | ICD-10-CM

## 2020-09-03 DIAGNOSIS — N52.01 ERECTILE DYSFUNCTION DUE TO ARTERIAL INSUFFICIENCY: ICD-10-CM

## 2020-09-03 DIAGNOSIS — E78.5 HYPERLIPIDEMIA LDL GOAL <130: ICD-10-CM

## 2020-09-03 DIAGNOSIS — I10 HYPERTENSION GOAL BP (BLOOD PRESSURE) < 140/90: ICD-10-CM

## 2020-09-03 DIAGNOSIS — Z12.5 SCREENING FOR PROSTATE CANCER: ICD-10-CM

## 2020-09-03 PROCEDURE — 80061 LIPID PANEL: CPT | Performed by: FAMILY MEDICINE

## 2020-09-03 PROCEDURE — 36415 COLL VENOUS BLD VENIPUNCTURE: CPT | Performed by: FAMILY MEDICINE

## 2020-09-03 PROCEDURE — 90471 IMMUNIZATION ADMIN: CPT | Performed by: FAMILY MEDICINE

## 2020-09-03 PROCEDURE — 80053 COMPREHEN METABOLIC PANEL: CPT | Performed by: FAMILY MEDICINE

## 2020-09-03 PROCEDURE — 99396 PREV VISIT EST AGE 40-64: CPT | Mod: 25 | Performed by: FAMILY MEDICINE

## 2020-09-03 PROCEDURE — 90682 RIV4 VACC RECOMBINANT DNA IM: CPT | Performed by: FAMILY MEDICINE

## 2020-09-03 PROCEDURE — G0103 PSA SCREENING: HCPCS | Performed by: FAMILY MEDICINE

## 2020-09-03 PROCEDURE — 82043 UR ALBUMIN QUANTITATIVE: CPT | Performed by: FAMILY MEDICINE

## 2020-09-03 RX ORDER — LISINOPRIL 20 MG/1
20 TABLET ORAL DAILY
Qty: 90 TABLET | Refills: 3 | Status: SHIPPED | OUTPATIENT
Start: 2020-09-03 | End: 2021-11-26

## 2020-09-03 RX ORDER — HYDROCHLOROTHIAZIDE 25 MG/1
25 TABLET ORAL DAILY
Qty: 90 TABLET | Refills: 3 | Status: SHIPPED | OUTPATIENT
Start: 2020-09-03 | End: 2021-11-26

## 2020-09-03 RX ORDER — SIMVASTATIN 20 MG
20 TABLET ORAL DAILY
Qty: 90 TABLET | Refills: 3 | Status: SHIPPED | OUTPATIENT
Start: 2020-09-03 | End: 2021-11-26

## 2020-09-03 RX ORDER — SILDENAFIL 100 MG/1
TABLET, FILM COATED ORAL
Qty: 6 TABLET | Refills: 4 | Status: SHIPPED | OUTPATIENT
Start: 2020-09-03

## 2020-09-03 ASSESSMENT — MIFFLIN-ST. JEOR: SCORE: 1585.51

## 2020-09-03 NOTE — PROGRESS NOTES
3  SUBJECTIVE:   CC: Bezabeh Assefa is an 65 year old male who presents for preventive health visit.     Healthy Habits:    Do you get at least three servings of calcium containing foods daily (dairy, green leafy vegetables, etc.)? no    Amount of exercise or daily activities, outside of work: 3-4 day(s) per week    Problems taking medications regularly No    Medication side effects: No    Have you had an eye exam in the past two years? yes    Do you see a dentist twice per year? no    Do you have sleep apnea, excessive snoring or daytime drowsiness?no      Hyperlipidemia Follow-Up      Are you regularly taking any medication or supplement to lower your cholesterol?   Yes- statin    Are you having muscle aches or other side effects that you think could be caused by your cholesterol lowering medication?  No    Hypertension Follow-up      Do you check your blood pressure regularly outside of the clinic? Yes     Are you following a low salt diet? No    Are your blood pressures ever more than 140 on the top number (systolic) OR more   than 90 on the bottom number (diastolic), for example 140/90? No    Encounter Diagnoses   Name Primary?     Routine general medical examination at a health care facility Yes     Hypertension goal BP (blood pressure) < 140/90      Erectile dysfunction due to arterial insufficiency, Well controlled with viagra      Hyperlipidemia LDL goal <130      Screening for prostate cancer         Today's PHQ-2 Score:   PHQ-2 ( 1999 Pfizer) 9/3/2020 6/18/2019   Q1: Little interest or pleasure in doing things 0 0   Q2: Feeling down, depressed or hopeless 0 0   PHQ-2 Score 0 0   Q1: Little interest or pleasure in doing things - Not at all   Q2: Feeling down, depressed or hopeless - Not at all   PHQ-2 Score - 0       Abuse: Current or Past(Physical, Sexual or Emotional)- No  Do you feel safe in your environment? Yes    Have you ever done Advance Care Planning? (For example, a Health Directive, POLST, or  a discussion with a medical provider or your loved ones about your wishes): No, advance care planning information given to patient to review.  Patient declined advance care planning discussion at this time.    Social History     Tobacco Use     Smoking status: Current Every Day Smoker     Packs/day: 0.50     Years: 30.00     Pack years: 15.00     Types: Cigarettes     Smokeless tobacco: Never Used   Substance Use Topics     Alcohol use: Yes     Comment: 1 every 3 - 4 months     If you drink alcohol do you typically have >3 drinks per day or >7 drinks per week? No                      Last PSA:   PSA   Date Value Ref Range Status   06/20/2019 0.56 0 - 4 ug/L Final     Comment:     Assay Method:  Chemiluminescence using Siemens Vista analyzer       Reviewed orders with patient. Reviewed health maintenance and updated orders accordingly - Yes  Lab work is in process    Reviewed and updated as needed this visit by clinical staff  Tobacco  Allergies  Meds  Med Hx  Surg Hx  Fam Hx  Soc Hx        Reviewed and updated as needed this visit by Provider        Past Medical History:   Diagnosis Date     History of colonoscopy     Next due 5 years (11/2017)     Hypertension         ROS:  CONSTITUTIONAL: NEGATIVE for fever, chills, change in weight  INTEGUMENTARY/SKIN: NEGATIVE for worrisome rashes, moles or lesions  EYES: NEGATIVE for vision changes or irritation  ENT: NEGATIVE for ear, mouth and throat problems  RESP: NEGATIVE for significant cough or SOB  CV: NEGATIVE for chest pain, palpitations or peripheral edema  GI: NEGATIVE for nausea, abdominal pain, heartburn, or change in bowel habits   male: negative for dysuria, hematuria, decreased urinary stream, erectile dysfunction, urethral discharge  MUSCULOSKELETAL: NEGATIVE for significant arthralgias or myalgia  NEURO: NEGATIVE for weakness, dizziness or paresthesias  PSYCHIATRIC: NEGATIVE for changes in mood or affect    OBJECTIVE:   /80   Pulse 91   Temp  "97.8  F (36.6  C) (Temporal)   Ht 1.778 m (5' 10\")   Wt 78.9 kg (174 lb)   SpO2 98%   BMI 24.97 kg/m    EXAM:  GENERAL: healthy, alert and no distress  EYES: Eyes grossly normal to inspection, PERRL and conjunctivae and sclerae normal  HENT: ear canals and TM's normal, nose and mouth without ulcers or lesions  NECK: no adenopathy, no asymmetry, masses, or scars and thyroid normal to palpation  RESP: lungs clear to auscultation - no rales, rhonchi or wheezes  CV: regular rate and rhythm, normal S1 S2, no S3 or S4, no murmur, click or rub, no peripheral edema and peripheral pulses strong  ABDOMEN: soft, nontender, no hepatosplenomegaly, no masses and bowel sounds normal   (male): normal male genitalia without lesions or urethral discharge, no hernia  RECTAL (male): normal sphincter tone, no rectal masses, prostate normal size, smooth, nontender without nodules or masses  MS: no gross musculoskeletal defects noted, no edema  SKIN: no suspicious lesions or rashes  NEURO: Normal strength and tone, mentation intact and speech normal  PSYCH: mentation appears normal, affect normal/bright  LYMPH: no cervical, supraclavicular, axillary, or inguinal adenopathy    Diagnostic Test Results:  Labs reviewed in Epic    ASSESSMENT/PLAN:   1. Routine general medical examination at a health care facility  [facility]    2. Hypertension goal BP (blood pressure) < 140/90  Well controlled   - lisinopril (ZESTRIL) 20 MG tablet; Take 1 tablet (20 mg) by mouth daily  Dispense: 90 tablet; Refill: 3  - hydrochlorothiazide (HYDRODIURIL) 25 MG tablet; Take 1 tablet (25 mg) by mouth daily  Dispense: 90 tablet; Refill: 3  - Albumin Random Urine Quantitative with Creat Ratio  - Comprehensive metabolic panel  - Lipid panel reflex to direct LDL Fasting    3. Erectile dysfunction due to arterial insufficiency  Well controlled   - sildenafil (VIAGRA) 100 MG tablet; TAKE 1 TABLET BY MOUTH DAILY AS NEEDED  Dispense: 6 tablet; Refill: " "4    4. Hyperlipidemia LDL goal <130  Well controlled   - simvastatin (ZOCOR) 20 MG tablet; Take 1 tablet (20 mg) by mouth daily  Dispense: 90 tablet; Refill: 3  - Lipid panel reflex to direct LDL Fasting    5. Screening for prostate cancer  sent  - PSA, screen    COUNSELING:  Reviewed preventive health counseling, as reflected in patient instructions       Regular exercise       Healthy diet/nutrition       Vision screening       Hearing screening       Immunizations    Vaccinated for: Influenza             Safe sex practices/STD prevention       Colon cancer screening       Prostate cancer screening    Estimated body mass index is 24.97 kg/m  as calculated from the following:    Height as of this encounter: 1.778 m (5' 10\").    Weight as of this encounter: 78.9 kg (174 lb).        He reports that he has been smoking cigarettes. He has a 15.00 pack-year smoking history. He has never used smokeless tobacco.  Tobacco Cessation Action Plan:   Information offered: Patient not interested at this time      Counseling Resources:  ATP IV Guidelines  Pooled Cohorts Equation Calculator  FRAX Risk Assessment  ICSI Preventive Guidelines  Dietary Guidelines for Americans, 2010  USDA's MyPlate  ASA Prophylaxis  Lung CA Screening    Elias Ballesteros MD  [facility]  "

## 2020-09-04 LAB
ALBUMIN SERPL-MCNC: 4.1 G/DL (ref 3.4–5)
ALP SERPL-CCNC: 67 U/L (ref 40–150)
ALT SERPL W P-5'-P-CCNC: 41 U/L (ref 0–70)
ANION GAP SERPL CALCULATED.3IONS-SCNC: 7 MMOL/L (ref 3–14)
AST SERPL W P-5'-P-CCNC: 22 U/L (ref 0–45)
BILIRUB SERPL-MCNC: 0.5 MG/DL (ref 0.2–1.3)
BUN SERPL-MCNC: 9 MG/DL (ref 7–30)
CALCIUM SERPL-MCNC: 9.6 MG/DL (ref 8.5–10.1)
CHLORIDE SERPL-SCNC: 101 MMOL/L (ref 94–109)
CHOLEST SERPL-MCNC: 129 MG/DL
CO2 SERPL-SCNC: 26 MMOL/L (ref 20–32)
CREAT SERPL-MCNC: 0.96 MG/DL (ref 0.66–1.25)
CREAT UR-MCNC: 266 MG/DL
GFR SERPL CREATININE-BSD FRML MDRD: 83 ML/MIN/{1.73_M2}
GLUCOSE SERPL-MCNC: 106 MG/DL (ref 70–99)
HDLC SERPL-MCNC: 32 MG/DL
LDLC SERPL CALC-MCNC: 66 MG/DL
MICROALBUMIN UR-MCNC: 18 MG/L
MICROALBUMIN/CREAT UR: 6.92 MG/G CR (ref 0–17)
NONHDLC SERPL-MCNC: 97 MG/DL
POTASSIUM SERPL-SCNC: 3.7 MMOL/L (ref 3.4–5.3)
PROT SERPL-MCNC: 7.3 G/DL (ref 6.8–8.8)
PSA SERPL-ACNC: 0.35 UG/L (ref 0–4)
SODIUM SERPL-SCNC: 134 MMOL/L (ref 133–144)
TRIGL SERPL-MCNC: 155 MG/DL

## 2021-07-23 NOTE — TELEPHONE ENCOUNTER
FUTURE VISIT INFORMATION      FUTURE VISIT INFORMATION:    Date: 7/27/01    Time: 1:40pm    Location: [facility]  REFERRAL INFORMATION:    Referring provider:  self    Referring providers clinic:  N/A    Reason for visit/diagnosis  general eye exam    RECORDS REQUESTED FROM:       Clinic name Comments Records Status Imaging Status   [facility] 7/26/17 EPIC

## 2021-07-27 ENCOUNTER — PRE VISIT (OUTPATIENT)
Dept: OPHTHALMOLOGY | Facility: CLINIC | Age: 66
End: 2021-07-27

## 2021-07-27 ENCOUNTER — OFFICE VISIT (OUTPATIENT)
Dept: OPHTHALMOLOGY | Facility: CLINIC | Age: 66
End: 2021-07-27
Payer: COMMERCIAL

## 2021-07-27 DIAGNOSIS — H17.9 BILATERAL CORNEA SCARS: ICD-10-CM

## 2021-07-27 DIAGNOSIS — H52.4 PRESBYOPIA OF BOTH EYES: ICD-10-CM

## 2021-07-27 DIAGNOSIS — H52.00 HYPERMETROPIA, UNSPECIFIED LATERALITY: ICD-10-CM

## 2021-07-27 DIAGNOSIS — H11.153 PINGUECULA OF BOTH EYES: ICD-10-CM

## 2021-07-27 DIAGNOSIS — H25.13 NUCLEAR SCLEROTIC CATARACT OF BOTH EYES: Primary | ICD-10-CM

## 2021-07-27 PROCEDURE — 92015 DETERMINE REFRACTIVE STATE: CPT | Performed by: OPHTHALMOLOGY

## 2021-07-27 PROCEDURE — 92004 COMPRE OPH EXAM NEW PT 1/>: CPT | Performed by: OPHTHALMOLOGY

## 2021-07-27 ASSESSMENT — CUP TO DISC RATIO
OD_RATIO: 0.4
OS_RATIO: 0.4

## 2021-07-27 ASSESSMENT — VISUAL ACUITY
OS_PH_SC+: -1
METHOD: SNELLEN - LINEAR
OS_SC: 20/40
OD_SC+: -1
OD_SC: 20/25
OS_PH_SC: 20/20
OD_PH_SC: 20/20

## 2021-07-27 ASSESSMENT — REFRACTION_MANIFEST
OS_CYLINDER: +0.75
OS_AXIS: 170
OD_CYLINDER: SPHERE
OD_ADD: +2.50
OS_SPHERE: +0.50
OS_ADD: +2.50
OD_SPHERE: +0.75

## 2021-07-27 ASSESSMENT — CONF VISUAL FIELD
OS_NORMAL: 1
OD_NORMAL: 1
METHOD: COUNTING FINGERS

## 2021-07-27 ASSESSMENT — EXTERNAL EXAM - LEFT EYE: OS_EXAM: NORMAL

## 2021-07-27 ASSESSMENT — TONOMETRY
OD_IOP_MMHG: 17
IOP_METHOD: ICARE
OS_IOP_MMHG: 14

## 2021-07-27 ASSESSMENT — EXTERNAL EXAM - RIGHT EYE: OD_EXAM: NORMAL

## 2021-07-27 ASSESSMENT — SLIT LAMP EXAM - LIDS
COMMENTS: TR MGD
COMMENTS: TR MGD

## 2021-07-27 NOTE — PROGRESS NOTES
"HPI  Bezabeh Assefa is a 66 year old male here for comprehensive eye exam.  Has mild blurr vision for distance without correction and blurry for near with current over the counter readers, especially when going from looking at distance to looking at near.  Recently had to \"up the power\" for his computer and reading glasses.  Denies eye pain or irritation.      PMH:   Past Medical History:   Diagnosis Date     History of colonoscopy     Next due 5 years (11/2017)     Hypertension       POH: over the counter reading glasses, no surgery, no trauma  Oc Meds: none  FH: Denies any glaucoma, age related macular degeneration, or other known eye diseases         Assessment & Plan      (H25.13) Nuclear sclerotic cataract of both eyes - Both Eyes  (primary encounter diagnosis)  Comment: mild not visually significant   Plan: follow    (H52.00) Hypermetropia, unspecified laterality - Both Eyes  (H52.4) Presbyopia of both eyes - Both Eyes  Comment: mild distance correction would help, discussed glasses options with patient   Plan: manifest refraction done and prescription for glasses given can increase power of over the counter readers or try progressive add lenses     (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 Patient to call sooner as needed.    Complete documentation of historical and exam elements from today's encounter can be found in the full encounter summary report (not reduplicated in this progress note). I personally obtained the chief complaint(s) and history of present illness.  I have confirmed and edited as necessary the CC, HPI, PMH/PSH, social history, FMH, ROS, and exam/neuro findings " as obtained by the technician or others. I have examined this patient myself and I personally viewed the image(s) and studies listed above and the documentation reflects my findings and interpretation.     Marissa Stevens MD

## 2021-07-27 NOTE — NURSING NOTE
"Chief Complaints and History of Present Illnesses   Patient presents with     COMPREHENSIVE EYE EXAM     Chief Complaint(s) and History of Present Illness(es)     COMPREHENSIVE EYE EXAM     Laterality: both eyes    Onset: 1 week ago    Quality: blurred vision    Severity: moderate    Context: mid-range vision    Associated symptoms: fatigue.  Negative for discharge, itching, eye pain, floaters, flashes, burning, foreign body sensation and swelling    Treatments tried: no treatments    Pain scale: 0/10              Comments     Pt here today for annual routine eye exam. MAYDA was here (Jimmy 7/26/2017).  Pt states experiencing blurry vision especially when going from distance to near.  Pt was using readers only. Recently had to \"up the power\".  Pt notices the DVA to NVA blurriness especially when looking down.   When pt was in whirlpool hot tub he massaged neck and vision seemed better.   Pt wondering if this is ok.     Ocular meds = none    Nguyễn RUCKER, RINA 1:45 PM 07/27/2021                     "

## 2021-09-12 ENCOUNTER — HEALTH MAINTENANCE LETTER (OUTPATIENT)
Age: 66
End: 2021-09-12

## 2021-11-07 ENCOUNTER — HEALTH MAINTENANCE LETTER (OUTPATIENT)
Age: 66
End: 2021-11-07

## 2021-11-26 DIAGNOSIS — E78.5 HYPERLIPIDEMIA LDL GOAL <130: ICD-10-CM

## 2021-11-26 DIAGNOSIS — I10 HYPERTENSION GOAL BP (BLOOD PRESSURE) < 140/90: ICD-10-CM

## 2021-11-26 RX ORDER — HYDROCHLOROTHIAZIDE 25 MG/1
25 TABLET ORAL DAILY
Qty: 90 TABLET | Refills: 3 | Status: SHIPPED | OUTPATIENT
Start: 2021-11-26 | End: 2021-11-29

## 2021-11-26 RX ORDER — SIMVASTATIN 20 MG
20 TABLET ORAL DAILY
Qty: 90 TABLET | Refills: 3 | Status: SHIPPED | OUTPATIENT
Start: 2021-11-26 | End: 2021-11-29

## 2021-11-26 RX ORDER — LISINOPRIL 20 MG/1
20 TABLET ORAL DAILY
Qty: 90 TABLET | Refills: 3 | Status: SHIPPED | OUTPATIENT
Start: 2021-11-26 | End: 2021-11-29

## 2021-11-26 NOTE — TELEPHONE ENCOUNTER
"Elvira refills x 1. Pt has upcoming appt 12/28.     Requested Prescriptions   Signed Prescriptions Disp Refills    hydrochlorothiazide (HYDRODIURIL) 25 MG tablet 90 tablet 3     Sig: Take 1 tablet (25 mg) by mouth daily       Diuretics (Including Combos) Protocol Failed - 11/26/2021 10:22 AM        Failed - Blood pressure under 140/90 in past 12 months     BP Readings from Last 3 Encounters:   09/03/20 110/80   10/24/19 108/75   08/20/19 119/82                 Failed - Recent (12 mo) or future (30 days) visit within the authorizing provider's specialty     Patient has had an office visit with the authorizing provider or a provider within the authorizing providers department within the previous 12 mos or has a future within next 30 days. See \"Patient Info\" tab in inbasket, or \"Choose Columns\" in Meds & Orders section of the refill encounter.              Failed - Normal serum creatinine on file in past 12 months     Recent Labs   Lab Test 09/03/20  1342   CR 0.96              Failed - Normal serum potassium on file in past 12 months     Recent Labs   Lab Test 09/03/20  1342   POTASSIUM 3.7                    Failed - Normal serum sodium on file in past 12 months     Recent Labs   Lab Test 09/03/20  1342                 Passed - Medication is active on med list        Passed - Patient is age 18 or older          lisinopril (ZESTRIL) 20 MG tablet 90 tablet 3     Sig: Take 1 tablet (20 mg) by mouth daily       ACE Inhibitors (Including Combos) Protocol Failed - 11/26/2021 10:22 AM        Failed - Blood pressure under 140/90 in past 12 months     BP Readings from Last 3 Encounters:   09/03/20 110/80   10/24/19 108/75   08/20/19 119/82                 Failed - Recent (12 mo) or future (30 days) visit within the authorizing provider's specialty     Patient has had an office visit with the authorizing provider or a provider within the authorizing providers department within the previous 12 mos or has a future within " "next 30 days. See \"Patient Info\" tab in inbasket, or \"Choose Columns\" in Meds & Orders section of the refill encounter.              Failed - Normal serum creatinine on file in past 12 months     Recent Labs   Lab Test 09/03/20  1342   CR 0.96       Ok to refill medication if creatinine is low          Failed - Normal serum potassium on file in past 12 months     Recent Labs   Lab Test 09/03/20  1342   POTASSIUM 3.7             Passed - Medication is active on med list        Passed - Patient is age 18 or older          simvastatin (ZOCOR) 20 MG tablet 90 tablet 3     Sig: Take 1 tablet (20 mg) by mouth daily       Statins Protocol Failed - 11/26/2021 10:22 AM        Failed - LDL on file in past 12 months     Recent Labs   Lab Test 09/03/20  1342   LDL 66             Failed - Recent (12 mo) or future (30 days) visit within the authorizing provider's specialty     Patient has had an office visit with the authorizing provider or a provider within the authorizing providers department within the previous 12 mos or has a future within next 30 days. See \"Patient Info\" tab in inbasket, or \"Choose Columns\" in Meds & Orders section of the refill encounter.              Passed - No abnormal creatine kinase in past 12 months     No lab results found.             Passed - Medication is active on med list        Passed - Patient is age 18 or older           Mag Simpson RN  [facility]     "

## 2021-11-27 DIAGNOSIS — I10 HYPERTENSION GOAL BP (BLOOD PRESSURE) < 140/90: ICD-10-CM

## 2021-11-27 DIAGNOSIS — E78.5 HYPERLIPIDEMIA LDL GOAL <130: ICD-10-CM

## 2021-11-29 RX ORDER — LISINOPRIL 20 MG/1
TABLET ORAL
Qty: 90 TABLET | Refills: 0 | Status: SHIPPED | OUTPATIENT
Start: 2021-11-29 | End: 2021-12-28

## 2021-11-29 RX ORDER — HYDROCHLOROTHIAZIDE 25 MG/1
TABLET ORAL
Qty: 90 TABLET | Refills: 0 | Status: SHIPPED | OUTPATIENT
Start: 2021-11-29 | End: 2021-12-28

## 2021-11-29 RX ORDER — SIMVASTATIN 20 MG
TABLET ORAL
Qty: 90 TABLET | Refills: 0 | Status: SHIPPED | OUTPATIENT
Start: 2021-11-29 | End: 2021-12-28

## 2021-11-29 NOTE — TELEPHONE ENCOUNTER
Dr. Ballesteros,    I am giving pt stefanie refills as he has an appt with you on 12/28. However, needs multiple labs so I am sending to you as FYI for labs.     LDL, Cr, K, N    Thanks,  ELISE Hathaway  [facility]

## 2021-12-21 ASSESSMENT — ACTIVITIES OF DAILY LIVING (ADL): CURRENT_FUNCTION: NO ASSISTANCE NEEDED

## 2021-12-24 NOTE — PATIENT INSTRUCTIONS
Patient Education   Personalized Prevention Plan  You are due for the preventive services outlined below.  Your care team is available to assist you in scheduling these services.  If you have already completed any of these items, please share that information with your care team to update in your medical record.  Health Maintenance Due   Topic Date Due     HIV Screening  Never done     Zoster (Shingles) Vaccine (1 of 2) Never done     LUNG CANCER SCREENING  Never done     FALL RISK ASSESSMENT  Never done     AORTIC ANEURYSM SCREENING (SYSTEM ASSIGNED)  Never done     Flu Vaccine (1) 09/01/2021     COVID-19 Vaccine (3 - Booster for Pfizer series) 10/21/2021     Diptheria Tetanus Pertussis (DTAP/TDAP/TD) Vaccine (2 - Td or Tdap) 12/01/2021

## 2021-12-24 NOTE — PROGRESS NOTES
"  SUBJECTIVE:   Bezabeh Assefa is a 66 year old male who presents for Preventive Visit.      Patient has been advised of split billing requirements and indicates understanding: Yes  Are you in the first 12 months of your Medicare Part B coverage?  Yes,  Visual Acuity:  Right Eye: 20/20   Left Eye: 20/20  Both Eyes: 20/20    Answers for HPI/ROS submitted by the patient on 2021  In general, how would you rate your overall physical health?: excellent  Frequency of exercise:: 2-3 days/week  Do you usually eat at least 4 servings of fruit and vegetables a day, include whole grains & fiber, and avoid regularly eating high fat or \"junk\" foods? : Yes  Taking medications regularly:: Yes  Activities of Daily Living: no assistance needed  Home safety: no safety concerns identified  Hearing Impairment:: no hearing concerns  In the past 6 months, have you been bothered by leaking of urine?: No  In general, how would you rate your overall mental or emotional health?: excellent  Additional concerns today:: No  Duration of exercise:: 30-45 minutes      Do you feel safe in your environment? Yes    Have you ever done Advance Care Planning? (For example, a Health Directive, POLST, or a discussion with a medical provider or your loved ones about your wishes): No, advance care planning information given to patient to review.  Patient declined advance care planning discussion at this time.    Additional concerns to address?  No    Fall risk:  Fallen 2 or more times in the past year?: No  Any fall with injury in the past year?: No  click delete button to remove this line now  Cognitive Screenin) Repeat 3 items (Leader, Season, Table)    2) Clock draw: NORMAL  3) 3 item recall: Recalls 3 objects  Results: 3 items recalled: COGNITIVE IMPAIRMENT LESS LIKELY    Mini-CogTM Copyright S Brianna. Licensed by the author for use in [facility]; reprinted with permission (amanda@.edu). All rights reserved.      Do you have " sleep apnea, excessive snoring or daytime drowsiness?: no        Hypertension Follow-up      Do you check your blood pressure regularly outside of the clinic? Yes     Are you following a low salt diet? Yes    Are your blood pressures ever more than 140 on the top number (systolic) OR more   than 90 on the bottom number (diastolic), for example 140/90? No      Reviewed and updated as needed this visit by clinical staff  Tobacco  Allergies  Meds   Med Hx  Surg Hx  Fam Hx  Soc Hx       Reviewed and updated as needed this visit by Provider               Social History     Tobacco Use     Smoking status: Current Every Day Smoker     Packs/day: 0.50     Years: 30.00     Pack years: 15.00     Types: Cigarettes     Smokeless tobacco: Never Used   Substance Use Topics     Alcohol use: Yes     Comment: 1 every 3 - 4 months                           Current providers sharing in care for this patient include:   Patient Care Team:  Vickie Barone MD as PCP - Fred Brandt OD as MD (Optometry)  Elias Ballesteros MD as Assigned PCP  Marissa tSevens MD as MD (Ophthalmology)  Marissa Stevens MD as Assigned Surgical Provider    The following health maintenance items are reviewed in Epic and correct as of today:  Health Maintenance   Topic Date Due     ANNUAL REVIEW OF HM ORDERS  Never done     HIV SCREENING  Never done     ZOSTER IMMUNIZATION (1 of 2) Never done     LUNG CANCER SCREENING  Never done     FALL RISK ASSESSMENT  Never done     AORTIC ANEURYSM SCREENING (SYSTEM ASSIGNED)  Never done     DTAP/TDAP/TD IMMUNIZATION (2 - Td or Tdap) 12/01/2021     COLORECTAL CANCER SCREENING  11/12/2022     MEDICARE ANNUAL WELLNESS VISIT  12/28/2022     Pneumococcal Vaccine: Pediatrics (0 to 5 Years) and At-Risk Patients (6 to 64 Years) (2 of 2 - PPSV23) 05/25/2023     Pneumococcal Vaccine: 65+ Years (2 of 2 - PPSV23) 05/25/2023     LIPID  09/03/2025     ADVANCE CARE PLANNING  12/28/2026  "    HEPATITIS C SCREENING  Completed     PHQ-2  Completed     INFLUENZA VACCINE  Completed     COVID-19 Vaccine  Completed     IPV IMMUNIZATION  Aged Out     MENINGITIS IMMUNIZATION  Aged Out     HEPATITIS B IMMUNIZATION  Aged Out     Lab work is in process  Pneumonia Vaccine:Adults age 65+ who received Pneumovax (PPSV23) at 65 years or older: Should be given PCV13 > 1 year after their most recent PPSV23    ROS:  Constitutional, HEENT, cardiovascular, pulmonary, gi and gu systems are negative, except as otherwise noted.    OBJECTIVE:   /70   Pulse 91   Temp 97.9  F (36.6  C) (Temporal)   Wt 73.5 kg (162 lb)   SpO2 100%   BMI 23.24 kg/m   Estimated body mass index is 23.24 kg/m  as calculated from the following:    Height as of 9/3/20: 1.778 m (5' 10\").    Weight as of this encounter: 73.5 kg (162 lb).  EXAM:   GENERAL: healthy, alert and no distress  EYES: Eyes grossly normal to inspection, PERRL and conjunctivae and sclerae normal  HENT: ear canals and TM's normal, nose and mouth without ulcers or lesions  NECK: no adenopathy, no asymmetry, masses, or scars and thyroid normal to palpation  RESP: lungs clear to auscultation - no rales, rhonchi or wheezes  CV: regular rate and rhythm, normal S1 S2, no S3 or S4, no murmur, click or rub, no peripheral edema and peripheral pulses strong  ABDOMEN: soft, nontender, no hepatosplenomegaly, no masses and bowel sounds normal   (male): normal male genitalia without lesions or urethral discharge, no hernia  MS: no gross musculoskeletal defects noted, no edema  SKIN: no suspicious lesions or rashes  NEURO: Normal strength and tone, mentation intact and speech normal  PSYCH: mentation appears normal, affect normal/bright  LYMPH: no cervical, supraclavicular, axillary, or inguinal adenopathy    Diagnostic Test Results:  Labs reviewed in Epic    ASSESSMENT / PLAN:   (Z00.00) Encounter for Medicare annual wellness exam  (primary encounter diagnosis)  Comment: " "inexcellent health  Plan: continue exercise    (I10) Hypertension goal BP (blood pressure) < 140/90  Comment: Well controlled   Plan: Comprehensive metabolic panel (BMP + Alb, Alk         Phos, ALT, AST, Total. Bili, TP), Albumin         Random Urine Quantitative with Creat Ratio,         hydrochlorothiazide (HYDRODIURIL) 25 MG tablet,        lisinopril (ZESTRIL) 20 MG tablet            (E78.5) Hyperlipidemia LDL goal <130  Comment: recheck  Plan: Lipid panel reflex to direct LDL Fasting,         simvastatin (ZOCOR) 20 MG tablet            (Z12.5) Screening for prostate cancer  Comment: sent  Plan: PSA, screen              Patient has been advised of split billing requirements and indicates understanding: No    COUNSELING:  Reviewed preventive health counseling, as reflected in patient instructions       Regular exercise       Healthy diet/nutrition       Vision screening       Hearing screening       Dental care       Bladder control       Fall risk prevention       Immunizations    Vaccinated for: TDAP             Colon cancer screening       Prostate cancer screening    Estimated body mass index is 23.24 kg/m  as calculated from the following:    Height as of 9/3/20: 1.778 m (5' 10\").    Weight as of this encounter: 73.5 kg (162 lb).        He reports that he has been smoking cigarettes. He has a 15.00 pack-year smoking history. He has never used smokeless tobacco.  Tobacco Cessation Action Plan:   Information offered: Patient not interested at this time    Appropriate preventive services were discussed with this patient, including applicable screening as appropriate for cardiovascular disease, diabetes, osteopenia/osteoporosis, and glaucoma.  As appropriate for age/gender, discussed screening for colorectal cancer, prostate cancer, breast cancer, and cervical cancer. Checklist reviewing preventive services available has been given to the patient.    Reviewed patients plan of care and provided an AVS. The Basic " Care Plan (routine screening as documented in Health Maintenance) for Bezabeh meets the Care Plan requirement. This Care Plan has been established and reviewed with the Patient.    Counseling Resources:  ATP IV Guidelines  Pooled Cohorts Equation Calculator  Breast Cancer Risk Calculator  BRCA-Related Cancer Risk Assessment: FHS-7 Tool  FRAX Risk Assessment  ICSI Preventive Guidelines  Dietary Guidelines for Americans, 2010  USDA's MyPlate  ASA Prophylaxis  Lung CA Screening    Elias Ballesteros MD  [facility]

## 2021-12-28 ENCOUNTER — OFFICE VISIT (OUTPATIENT)
Dept: FAMILY MEDICINE | Facility: CLINIC | Age: 66
End: 2021-12-28
Payer: COMMERCIAL

## 2021-12-28 VITALS
WEIGHT: 162 LBS | HEART RATE: 91 BPM | DIASTOLIC BLOOD PRESSURE: 70 MMHG | BODY MASS INDEX: 23.24 KG/M2 | TEMPERATURE: 97.9 F | SYSTOLIC BLOOD PRESSURE: 110 MMHG | OXYGEN SATURATION: 100 %

## 2021-12-28 DIAGNOSIS — Z00.00 ENCOUNTER FOR MEDICARE ANNUAL WELLNESS EXAM: Primary | ICD-10-CM

## 2021-12-28 DIAGNOSIS — Z12.5 SCREENING FOR PROSTATE CANCER: ICD-10-CM

## 2021-12-28 DIAGNOSIS — I10 HYPERTENSION GOAL BP (BLOOD PRESSURE) < 140/90: ICD-10-CM

## 2021-12-28 DIAGNOSIS — E78.5 HYPERLIPIDEMIA LDL GOAL <130: ICD-10-CM

## 2021-12-28 PROCEDURE — 80053 COMPREHEN METABOLIC PANEL: CPT | Performed by: FAMILY MEDICINE

## 2021-12-28 PROCEDURE — 91300 COVID-19,PF,PFIZER (12+ YRS): CPT | Performed by: FAMILY MEDICINE

## 2021-12-28 PROCEDURE — 0004A COVID-19,PF,PFIZER (12+ YRS): CPT | Performed by: FAMILY MEDICINE

## 2021-12-28 PROCEDURE — 90662 IIV NO PRSV INCREASED AG IM: CPT | Performed by: FAMILY MEDICINE

## 2021-12-28 PROCEDURE — 36415 COLL VENOUS BLD VENIPUNCTURE: CPT | Performed by: FAMILY MEDICINE

## 2021-12-28 PROCEDURE — 82043 UR ALBUMIN QUANTITATIVE: CPT | Performed by: FAMILY MEDICINE

## 2021-12-28 PROCEDURE — 80061 LIPID PANEL: CPT | Performed by: FAMILY MEDICINE

## 2021-12-28 PROCEDURE — G0103 PSA SCREENING: HCPCS | Performed by: FAMILY MEDICINE

## 2021-12-28 PROCEDURE — 99397 PER PM REEVAL EST PAT 65+ YR: CPT | Mod: 25 | Performed by: FAMILY MEDICINE

## 2021-12-28 PROCEDURE — 90471 IMMUNIZATION ADMIN: CPT | Performed by: FAMILY MEDICINE

## 2021-12-28 RX ORDER — HYDROCHLOROTHIAZIDE 25 MG/1
25 TABLET ORAL DAILY
Qty: 90 TABLET | Refills: 3 | Status: SHIPPED | OUTPATIENT
Start: 2021-12-28 | End: 2022-03-11

## 2021-12-28 RX ORDER — SIMVASTATIN 20 MG
20 TABLET ORAL DAILY
Qty: 90 TABLET | Refills: 3 | Status: SHIPPED | OUTPATIENT
Start: 2021-12-28 | End: 2022-03-11

## 2021-12-28 RX ORDER — LISINOPRIL 20 MG/1
20 TABLET ORAL DAILY
Qty: 90 TABLET | Refills: 3 | Status: SHIPPED | OUTPATIENT
Start: 2021-12-28 | End: 2022-03-11

## 2021-12-29 LAB
ALBUMIN SERPL-MCNC: 4 G/DL (ref 3.4–5)
ALP SERPL-CCNC: 64 U/L (ref 40–150)
ALT SERPL W P-5'-P-CCNC: 41 U/L (ref 0–70)
ANION GAP SERPL CALCULATED.3IONS-SCNC: 6 MMOL/L (ref 3–14)
AST SERPL W P-5'-P-CCNC: 23 U/L (ref 0–45)
BILIRUB SERPL-MCNC: 0.4 MG/DL (ref 0.2–1.3)
BUN SERPL-MCNC: 10 MG/DL (ref 7–30)
CALCIUM SERPL-MCNC: 9.1 MG/DL (ref 8.5–10.1)
CHLORIDE BLD-SCNC: 103 MMOL/L (ref 94–109)
CHOLEST SERPL-MCNC: 123 MG/DL
CO2 SERPL-SCNC: 26 MMOL/L (ref 20–32)
CREAT SERPL-MCNC: 0.89 MG/DL (ref 0.66–1.25)
CREAT UR-MCNC: 198 MG/DL
FASTING STATUS PATIENT QL REPORTED: YES
GFR SERPL CREATININE-BSD FRML MDRD: >90 ML/MIN/1.73M2
GLUCOSE BLD-MCNC: 95 MG/DL (ref 70–99)
HDLC SERPL-MCNC: 36 MG/DL
LDLC SERPL CALC-MCNC: 64 MG/DL
MICROALBUMIN UR-MCNC: 22 MG/L
MICROALBUMIN/CREAT UR: 11.11 MG/G CR (ref 0–17)
NONHDLC SERPL-MCNC: 87 MG/DL
POTASSIUM BLD-SCNC: 3.5 MMOL/L (ref 3.4–5.3)
PROT SERPL-MCNC: 7.3 G/DL (ref 6.8–8.8)
PSA SERPL-MCNC: 0.4 UG/L (ref 0–4)
SODIUM SERPL-SCNC: 135 MMOL/L (ref 133–144)
TRIGL SERPL-MCNC: 116 MG/DL

## 2022-03-11 DIAGNOSIS — I10 HYPERTENSION GOAL BP (BLOOD PRESSURE) < 140/90: ICD-10-CM

## 2022-03-11 DIAGNOSIS — E78.5 HYPERLIPIDEMIA LDL GOAL <130: ICD-10-CM

## 2022-03-11 RX ORDER — HYDROCHLOROTHIAZIDE 25 MG/1
25 TABLET ORAL DAILY
Qty: 90 TABLET | Refills: 3 | Status: SHIPPED | OUTPATIENT
Start: 2022-03-11 | End: 2023-06-16

## 2022-03-11 RX ORDER — LISINOPRIL 20 MG/1
20 TABLET ORAL DAILY
Qty: 90 TABLET | Refills: 3 | Status: SHIPPED | OUTPATIENT
Start: 2022-03-11 | End: 2023-06-16

## 2022-03-11 RX ORDER — SIMVASTATIN 20 MG
20 TABLET ORAL DAILY
Qty: 90 TABLET | Refills: 3 | Status: SHIPPED | OUTPATIENT
Start: 2022-03-11 | End: 2023-06-16

## 2022-03-11 NOTE — TELEPHONE ENCOUNTER
[id number] - [place], [place] - [address]

## 2022-10-30 ENCOUNTER — HEALTH MAINTENANCE LETTER (OUTPATIENT)
Age: 67
End: 2022-10-30

## 2023-04-08 ENCOUNTER — HEALTH MAINTENANCE LETTER (OUTPATIENT)
Age: 68
End: 2023-04-08

## 2023-06-10 ASSESSMENT — ENCOUNTER SYMPTOMS
HEADACHES: 0
NERVOUS/ANXIOUS: 0
EYE PAIN: 0
SORE THROAT: 0
PALPITATIONS: 0
DYSURIA: 0
PARESTHESIAS: 0
CONSTIPATION: 0
WEAKNESS: 0
FEVER: 0
ABDOMINAL PAIN: 0
DIZZINESS: 0
HEMATOCHEZIA: 0
COUGH: 0
HEARTBURN: 0
CHILLS: 0
FREQUENCY: 0
NAUSEA: 0
HEMATURIA: 0
JOINT SWELLING: 0
DIARRHEA: 0
MYALGIAS: 0
ARTHRALGIAS: 0
SHORTNESS OF BREATH: 0

## 2023-06-10 ASSESSMENT — ACTIVITIES OF DAILY LIVING (ADL): CURRENT_FUNCTION: NO ASSISTANCE NEEDED

## 2023-06-16 ENCOUNTER — OFFICE VISIT (OUTPATIENT)
Dept: FAMILY MEDICINE | Facility: CLINIC | Age: 68
End: 2023-06-16
Payer: COMMERCIAL

## 2023-06-16 VITALS
DIASTOLIC BLOOD PRESSURE: 76 MMHG | TEMPERATURE: 98.8 F | HEIGHT: 69 IN | HEART RATE: 88 BPM | BODY MASS INDEX: 24.29 KG/M2 | WEIGHT: 164 LBS | RESPIRATION RATE: 20 BRPM | SYSTOLIC BLOOD PRESSURE: 112 MMHG | OXYGEN SATURATION: 97 %

## 2023-06-16 DIAGNOSIS — E78.5 HYPERLIPIDEMIA LDL GOAL <130: ICD-10-CM

## 2023-06-16 DIAGNOSIS — Z23 NEED FOR DIPHTHERIA-TETANUS-PERTUSSIS (TDAP) VACCINE: ICD-10-CM

## 2023-06-16 DIAGNOSIS — F17.200 TOBACCO USE DISORDER: ICD-10-CM

## 2023-06-16 DIAGNOSIS — Z12.11 SCREEN FOR COLON CANCER: ICD-10-CM

## 2023-06-16 DIAGNOSIS — Z00.00 ENCOUNTER FOR MEDICARE ANNUAL WELLNESS EXAM: Primary | ICD-10-CM

## 2023-06-16 DIAGNOSIS — A09 TRAVELER'S DIARRHEA: ICD-10-CM

## 2023-06-16 DIAGNOSIS — I10 HYPERTENSION GOAL BP (BLOOD PRESSURE) < 140/90: ICD-10-CM

## 2023-06-16 DIAGNOSIS — Z12.5 SCREENING FOR PROSTATE CANCER: ICD-10-CM

## 2023-06-16 LAB
ALBUMIN SERPL BCG-MCNC: 4.4 G/DL (ref 3.5–5.2)
ALP SERPL-CCNC: 62 U/L (ref 40–129)
ALT SERPL W P-5'-P-CCNC: 34 U/L (ref 0–70)
ANION GAP SERPL CALCULATED.3IONS-SCNC: 13 MMOL/L (ref 7–15)
AST SERPL W P-5'-P-CCNC: 35 U/L (ref 0–45)
BILIRUB SERPL-MCNC: 0.5 MG/DL
BUN SERPL-MCNC: 10.1 MG/DL (ref 8–23)
CALCIUM SERPL-MCNC: 9.6 MG/DL (ref 8.8–10.2)
CHLORIDE SERPL-SCNC: 101 MMOL/L (ref 98–107)
CHOLEST SERPL-MCNC: 118 MG/DL
CREAT SERPL-MCNC: 0.83 MG/DL (ref 0.67–1.17)
CREAT UR-MCNC: 179 MG/DL
DEPRECATED HCO3 PLAS-SCNC: 24 MMOL/L (ref 22–29)
GFR SERPL CREATININE-BSD FRML MDRD: >90 ML/MIN/1.73M2
GLUCOSE SERPL-MCNC: 98 MG/DL (ref 70–99)
HDLC SERPL-MCNC: 33 MG/DL
LDLC SERPL CALC-MCNC: 63 MG/DL
MICROALBUMIN UR-MCNC: <12 MG/L
MICROALBUMIN/CREAT UR: NORMAL MG/G{CREAT}
NONHDLC SERPL-MCNC: 85 MG/DL
POTASSIUM SERPL-SCNC: 3.9 MMOL/L (ref 3.4–5.3)
PROT SERPL-MCNC: 7.4 G/DL (ref 6.4–8.3)
PSA SERPL DL<=0.01 NG/ML-MCNC: 0.55 NG/ML (ref 0–4.5)
SODIUM SERPL-SCNC: 138 MMOL/L (ref 136–145)
TRIGL SERPL-MCNC: 112 MG/DL

## 2023-06-16 PROCEDURE — G0438 PPPS, INITIAL VISIT: HCPCS | Performed by: FAMILY MEDICINE

## 2023-06-16 PROCEDURE — 82043 UR ALBUMIN QUANTITATIVE: CPT | Performed by: FAMILY MEDICINE

## 2023-06-16 PROCEDURE — 90715 TDAP VACCINE 7 YRS/> IM: CPT | Performed by: FAMILY MEDICINE

## 2023-06-16 PROCEDURE — 99213 OFFICE O/P EST LOW 20 MIN: CPT | Mod: 25 | Performed by: FAMILY MEDICINE

## 2023-06-16 PROCEDURE — 90677 PCV20 VACCINE IM: CPT | Performed by: FAMILY MEDICINE

## 2023-06-16 PROCEDURE — 36415 COLL VENOUS BLD VENIPUNCTURE: CPT | Performed by: FAMILY MEDICINE

## 2023-06-16 PROCEDURE — 80061 LIPID PANEL: CPT | Performed by: FAMILY MEDICINE

## 2023-06-16 PROCEDURE — 82570 ASSAY OF URINE CREATININE: CPT | Performed by: FAMILY MEDICINE

## 2023-06-16 PROCEDURE — 80053 COMPREHEN METABOLIC PANEL: CPT | Performed by: FAMILY MEDICINE

## 2023-06-16 PROCEDURE — 90472 IMMUNIZATION ADMIN EACH ADD: CPT | Performed by: FAMILY MEDICINE

## 2023-06-16 PROCEDURE — G0103 PSA SCREENING: HCPCS | Performed by: FAMILY MEDICINE

## 2023-06-16 PROCEDURE — 90471 IMMUNIZATION ADMIN: CPT | Performed by: FAMILY MEDICINE

## 2023-06-16 RX ORDER — HYDROCHLOROTHIAZIDE 25 MG/1
25 TABLET ORAL DAILY
Qty: 90 TABLET | Refills: 3 | Status: SHIPPED | OUTPATIENT
Start: 2023-06-16 | End: 2024-08-28

## 2023-06-16 RX ORDER — LISINOPRIL 20 MG/1
20 TABLET ORAL DAILY
Qty: 90 TABLET | Refills: 3 | Status: SHIPPED | OUTPATIENT
Start: 2023-06-16 | End: 2024-08-28

## 2023-06-16 RX ORDER — AZITHROMYCIN 250 MG/1
500 TABLET, FILM COATED ORAL ONCE
Qty: 2 TABLET | Refills: 0 | Status: SHIPPED | OUTPATIENT
Start: 2023-06-16 | End: 2023-06-16

## 2023-06-16 RX ORDER — SIMVASTATIN 20 MG
20 TABLET ORAL DAILY
Qty: 90 TABLET | Refills: 3 | Status: SHIPPED | OUTPATIENT
Start: 2023-06-16 | End: 2024-08-28

## 2023-06-16 ASSESSMENT — ENCOUNTER SYMPTOMS
NAUSEA: 0
DIARRHEA: 0
NERVOUS/ANXIOUS: 0
JOINT SWELLING: 0
HEMATURIA: 0
SORE THROAT: 0
COUGH: 0
ARTHRALGIAS: 0
PARESTHESIAS: 0
WEAKNESS: 0
HEMATOCHEZIA: 0
DIZZINESS: 0
MYALGIAS: 0
ABDOMINAL PAIN: 0
SHORTNESS OF BREATH: 0
CONSTIPATION: 0
PALPITATIONS: 0
FEVER: 0
CHILLS: 0
FREQUENCY: 0
DYSURIA: 0
EYE PAIN: 0
HEADACHES: 0
HEARTBURN: 0

## 2023-06-16 ASSESSMENT — ACTIVITIES OF DAILY LIVING (ADL): CURRENT_FUNCTION: NO ASSISTANCE NEEDED

## 2023-06-16 ASSESSMENT — PAIN SCALES - GENERAL: PAINLEVEL: NO PAIN (0)

## 2023-06-16 NOTE — PROGRESS NOTES
"SUBJECTIVE:   Evert is a 68 year old who presents for Preventive Visit.      6/16/2023     2:52 PM   Additional Questions   Roomed by Mindi Mack   Accompanied by N/A     Are you in the first 12 months of your Medicare coverage?  No    Healthy Habits:     In general, how would you rate your overall health?  Excellent    Frequency of exercise:  2-3 days/week    Duration of exercise:  15-30 minutes    Do you usually eat at least 4 servings of fruit and vegetables a day, include whole grains    & fiber and avoid regularly eating high fat or \"junk\" foods?  Yes    Taking medications regularly:  Yes    Medication side effects:  None    Ability to successfully perform activities of daily living:  No assistance needed    Home Safety:  No safety concerns identified    Hearing Impairment:  No hearing concerns    In the past 6 months, have you been bothered by leaking of urine?  No    In general, how would you rate your overall mental or emotional health?  Excellent      PHQ-2 Total Score: 0    Additional concerns today:  No    Pt will be traveling to [facility] in a month and would like to make sure he has refills on meds and is requesting a  medication to prevent travelers diarhea    Have you ever done Advance Care Planning? (For example, a Health Directive, POLST, or a discussion with a medical provider or your loved ones about your wishes): No, advance care planning information given to patient to review.  Patient plans to discuss their wishes with loved ones or provider.         Fall risk  Fallen 2 or more times in the past year?: No  Any fall with injury in the past year?: No    Cognitive Screening   1) Repeat 3 items (Leader, Season, Table)    2) Clock draw: NORMAL  3) 3 item recall: Recalls 2 objects   Results: NORMAL clock, 1-2 items recalled: COGNITIVE IMPAIRMENT LESS LIKELY    Mini-CogTM Copyright S Brianna. Licensed by the author for use in [facility]; reprinted with permission (amanda@.[email or web address]). All rights " reserved.      Do you have sleep apnea, excessive snoring or daytime drowsiness?: no    Reviewed and updated as needed this visit by clinical staff   Tobacco  Allergies  Meds              Reviewed and updated as needed this visit by Provider                 Social History     Tobacco Use     Smoking status: Every Day     Packs/day: 0.50     Years: 30.00     Pack years: 15.00     Types: Cigarettes     Smokeless tobacco: Never   Vaping Use     Vaping status: Never Used   Substance Use Topics     Alcohol use: Yes     Comment: 1 every 3 - 4 months             6/10/2023     9:13 AM   Alcohol Use   Prescreen: >3 drinks/day or >7 drinks/week? No          View : No data to display.              Do you have a current opioid prescription? No  Do you use any other controlled substances or medications that are not prescribed by a provider? None      Encounter Diagnoses   Name Primary?     Encounter for Medicare annual wellness exam Yes     Screen for colon cancer      Hypertension goal BP (blood pressure) < 140/90      Hyperlipidemia LDL goal <130      Tobacco use disorder      Screening for prostate cancer      Need for diphtheria-tetanus-pertussis (Tdap) vaccine      Traveler's diarrhea          Hyperlipidemia Follow-Up      Are you regularly taking any medication or supplement to lower your cholesterol?   Yes- statin    Are you having muscle aches or other side effects that you think could be caused by your cholesterol lowering medication?  No    Hypertension Follow-up      Do you check your blood pressure regularly outside of the clinic? Yes     Are you following a low salt diet? Yes    Are your blood pressures ever more than 140 on the top number (systolic) OR more   than 90 on the bottom number (diastolic), for example 140/90? No      Current providers sharing in care for this patient include:   Patient Care Team:  Elias Ballesteros MD as PCP - General  Fred Marquez OD as MD (Optometry)  Elias Ballesteros  MD Zia as Assigned PCP  Marissa Stevens MD as MD (Ophthalmology)    The following health maintenance items are reviewed in Epic and correct as of today:  Health Maintenance   Topic Date Due     ANNUAL REVIEW OF HM ORDERS  Never done     ZOSTER IMMUNIZATION (1 of 2) Never done     LUNG CANCER SCREENING  Never done     AORTIC ANEURYSM SCREENING (SYSTEM ASSIGNED)  Never done     DTAP/TDAP/TD IMMUNIZATION (3 - Td or Tdap) 12/01/2021     COVID-19 Vaccine (4 - Pfizer series) 02/22/2022     COLORECTAL CANCER SCREENING  11/12/2022     MEDICARE ANNUAL WELLNESS VISIT  12/28/2022     Pneumococcal Vaccine: 65+ Years (3 - PPSV23 if available, else PCV20) 05/25/2023     INFLUENZA VACCINE (Season Ended) 09/01/2023     FALL RISK ASSESSMENT  06/16/2024     LIPID  12/28/2026     ADVANCE CARE PLANNING  06/16/2028     HEPATITIS C SCREENING  Completed     PHQ-2 (once per calendar year)  Completed     IPV IMMUNIZATION  Aged Out     MENINGITIS IMMUNIZATION  Aged Out     Lab work is in process   did shots          Review of Systems   Constitutional: Negative for chills and fever.   HENT: Negative for congestion, ear pain, hearing loss and sore throat.    Eyes: Negative for pain and visual disturbance.   Respiratory: Negative for cough and shortness of breath.    Cardiovascular: Negative for chest pain, palpitations and peripheral edema.   Gastrointestinal: Negative for abdominal pain, constipation, diarrhea, heartburn, hematochezia and nausea.   Genitourinary: Negative for dysuria, frequency, genital sores, hematuria, penile discharge and urgency.   Musculoskeletal: Negative for arthralgias, joint swelling and myalgias.   Skin: Negative for rash.   Neurological: Negative for dizziness, weakness, headaches and paresthesias.   Psychiatric/Behavioral: Negative for mood changes. The patient is not nervous/anxious.      Constitutional, HEENT, cardiovascular, pulmonary, gi and gu systems are negative, except as otherwise  "noted.    OBJECTIVE:   /76 (BP Location: Left arm, Patient Position: Sitting, Cuff Size: Adult Regular)   Pulse 88   Temp 98.8  F (37.1  C) (Temporal)   Resp 20   Ht 1.75 m (5' 8.9\")   Wt 74.4 kg (164 lb)   SpO2 97%   BMI 24.29 kg/m   Estimated body mass index is 24.29 kg/m  as calculated from the following:    Height as of this encounter: 1.75 m (5' 8.9\").    Weight as of this encounter: 74.4 kg (164 lb).  Physical Exam  GENERAL: healthy, alert and no distress  EYES: Eyes grossly normal to inspection, PERRL and conjunctivae and sclerae normal  HENT: ear canals and TM's normal, nose and mouth without ulcers or lesions  NECK: no adenopathy, no asymmetry, masses, or scars and thyroid normal to palpation  RESP: lungs clear to auscultation - no rales, rhonchi or wheezes  CV: regular rate and rhythm, normal S1 S2, no S3 or S4, no murmur, click or rub, no peripheral edema and peripheral pulses strong  ABDOMEN: soft, nontender, no hepatosplenomegaly, no masses and bowel sounds normal  MS: no gross musculoskeletal defects noted, no edema  SKIN: no suspicious lesions or rashes  NEURO: Normal strength and tone, mentation intact and speech normal  BACK: no CVA tenderness, no paralumbar tenderness  PSYCH: mentation appears normal, affect normal/bright  LYMPH: no cervical, supraclavicular, axillary, or inguinal adenopathy    Diagnostic Test Results:  Labs reviewed in Epic    ASSESSMENT / PLAN:   (Z00.00) Encounter for Medicare annual wellness exam  (primary encounter diagnosis)  Comment: in good health  Plan: PRIMARY CARE FOLLOW-UP SCHEDULING            (Z12.11) Screen for colon cancer  Comment: jimmy do  Plan: Colonoscopy Screening  Referral            (I10) Hypertension goal BP (blood pressure) < 140/90  Comment: Well controlled   Plan: Comprehensive metabolic panel (BMP + Alb, Alk         Phos, ALT, AST, Total. Bili, TP), Lipid panel         reflex to direct LDL Fasting, Albumin Random         Urine " Quantitative with Creat Ratio,         hydrochlorothiazide (HYDRODIURIL) 25 MG tablet,        lisinopril (ZESTRIL) 20 MG tablet            (E78.5) Hyperlipidemia LDL goal <130  Comment: recheck  Plan: simvastatin (ZOCOR) 20 MG tablet            (F17.200) Tobacco use disorder  Comment: urged cesation  Plan:     (Z12.5) Screening for prostate cancer  Comment: sent  Plan: PSA, screen            (Z23) Need for diphtheria-tetanus-pertussis (Tdap) vaccine  Comment: done  Plan:     (A09) Traveler's diarrhea  Comment: use prn  Plan: azithromycin (ZITHROMAX) 250 MG tablet                    COUNSELING:  Reviewed preventive health counseling, as reflected in patient instructions       Regular exercise       Healthy diet/nutrition       Vision screening       Hearing screening       Dental care       Bladder control       Fall risk prevention       Immunizations    Vaccinated for: Pneumococcal and TDAP             Colon cancer screening       Prostate cancer screening        He reports that he has been smoking cigarettes. He has a 15.00 pack-year smoking history. He has never used smokeless tobacco.  Nicotine/Tobacco Cessation Plan:   Information offered: Patient not interested at this time      Appropriate preventive services were discussed with this patient, including applicable screening as appropriate for cardiovascular disease, diabetes, osteopenia/osteoporosis, and glaucoma.  As appropriate for age/gender, discussed screening for colorectal cancer, prostate cancer, breast cancer, and cervical cancer. Checklist reviewing preventive services available has been given to the patient.    Reviewed patients plan of care and provided an AVS. The Intermediate Care Plan ( asthma action plan, low back pain action plan, and migraine action plan) for Bezabeh meets the Care Plan requirement. This Care Plan has been established and reviewed with the Patient.      Elias Ballesteros MD  [facility]  [place]    Identified Health Risks:

## 2023-06-16 NOTE — PATIENT INSTRUCTIONS
Patient Education   Personalized Prevention Plan  You are due for the preventive services outlined below.  Your care team is available to assist you in scheduling these services.  If you have already completed any of these items, please share that information with your care team to update in your medical record.  Health Maintenance Due   Topic Date Due     ANNUAL REVIEW OF HM ORDERS  Never done     Zoster (Shingles) Vaccine (1 of 2) Never done     LUNG CANCER SCREENING  Never done     AORTIC ANEURYSM SCREENING (SYSTEM ASSIGNED)  Never done     Diptheria Tetanus Pertussis (DTAP/TDAP/TD) Vaccine (3 - Td or Tdap) 12/01/2021     COVID-19 Vaccine (4 - Pfizer series) 02/22/2022     Colorectal Cancer Screening  11/12/2022     Annual Wellness Visit  12/28/2022     Pneumococcal Vaccine (3 - PPSV23 if available, else PCV20) 05/25/2023

## 2024-04-08 ENCOUNTER — APPOINTMENT (OUTPATIENT)
Dept: GENERAL RADIOLOGY | Facility: CLINIC | Age: 69
End: 2024-04-08
Attending: STUDENT IN AN ORGANIZED HEALTH CARE EDUCATION/TRAINING PROGRAM
Payer: COMMERCIAL

## 2024-04-08 ENCOUNTER — HOSPITAL ENCOUNTER (EMERGENCY)
Facility: CLINIC | Age: 69
Discharge: HOME | End: 2024-04-08
Attending: STUDENT IN AN ORGANIZED HEALTH CARE EDUCATION/TRAINING PROGRAM | Admitting: STUDENT IN AN ORGANIZED HEALTH CARE EDUCATION/TRAINING PROGRAM
Payer: COMMERCIAL

## 2024-04-08 VITALS
WEIGHT: 169.9 LBS | OXYGEN SATURATION: 99 % | BODY MASS INDEX: 25.16 KG/M2 | DIASTOLIC BLOOD PRESSURE: 73 MMHG | RESPIRATION RATE: 16 BRPM | HEART RATE: 81 BPM | SYSTOLIC BLOOD PRESSURE: 113 MMHG | TEMPERATURE: 98.4 F

## 2024-04-08 DIAGNOSIS — M54.50 ACUTE BILATERAL LOW BACK PAIN WITHOUT SCIATICA: ICD-10-CM

## 2024-04-08 LAB
ALBUMIN UR-MCNC: 10 MG/DL
APPEARANCE UR: CLEAR
BILIRUB UR QL STRIP: NEGATIVE
COLOR UR AUTO: YELLOW
GLUCOSE UR STRIP-MCNC: NEGATIVE MG/DL
HGB UR QL STRIP: NEGATIVE
HYALINE CASTS: 3 /LPF
KETONES UR STRIP-MCNC: NEGATIVE MG/DL
LEUKOCYTE ESTERASE UR QL STRIP: NEGATIVE
MUCOUS THREADS #/AREA URNS LPF: PRESENT /LPF
NITRATE UR QL: NEGATIVE
PH UR STRIP: 5.5 [PH] (ref 5–7)
RBC URINE: 1 /HPF
SP GR UR STRIP: 1.03 (ref 1–1.03)
UROBILINOGEN UR STRIP-MCNC: 2 MG/DL
WBC URINE: 1 /HPF

## 2024-04-08 PROCEDURE — 250N000013 HC RX MED GY IP 250 OP 250 PS 637: Performed by: STUDENT IN AN ORGANIZED HEALTH CARE EDUCATION/TRAINING PROGRAM

## 2024-04-08 PROCEDURE — 99284 EMERGENCY DEPT VISIT MOD MDM: CPT | Performed by: STUDENT IN AN ORGANIZED HEALTH CARE EDUCATION/TRAINING PROGRAM

## 2024-04-08 PROCEDURE — 81003 URINALYSIS AUTO W/O SCOPE: CPT | Performed by: STUDENT IN AN ORGANIZED HEALTH CARE EDUCATION/TRAINING PROGRAM

## 2024-04-08 PROCEDURE — 72202 X-RAY EXAM SI JOINTS 3/> VWS: CPT

## 2024-04-08 PROCEDURE — 72100 X-RAY EXAM L-S SPINE 2/3 VWS: CPT

## 2024-04-08 RX ORDER — ACETAMINOPHEN 500 MG
1000 TABLET ORAL ONCE
Status: COMPLETED | OUTPATIENT
Start: 2024-04-08 | End: 2024-04-08

## 2024-04-08 RX ORDER — IBUPROFEN 600 MG/1
600 TABLET, FILM COATED ORAL ONCE
Status: COMPLETED | OUTPATIENT
Start: 2024-04-08 | End: 2024-04-08

## 2024-04-08 RX ORDER — DIAZEPAM 5 MG
5 TABLET ORAL EVERY 6 HOURS PRN
Qty: 12 TABLET | Refills: 0 | Status: SHIPPED | OUTPATIENT
Start: 2024-04-08 | End: 2024-04-11

## 2024-04-08 RX ORDER — IBUPROFEN 200 MG
400 TABLET ORAL EVERY 6 HOURS PRN
Qty: 112 TABLET | Refills: 0 | Status: SHIPPED | OUTPATIENT
Start: 2024-04-08 | End: 2024-04-22

## 2024-04-08 RX ORDER — ACETAMINOPHEN 500 MG
1000 TABLET ORAL EVERY 6 HOURS PRN
Qty: 112 TABLET | Refills: 0 | Status: SHIPPED | OUTPATIENT
Start: 2024-04-08 | End: 2024-04-22

## 2024-04-08 RX ADMIN — ACETAMINOPHEN 1000 MG: 500 TABLET ORAL at 11:15

## 2024-04-08 RX ADMIN — IBUPROFEN 600 MG: 600 TABLET, FILM COATED ORAL at 11:15

## 2024-04-08 ASSESSMENT — ACTIVITIES OF DAILY LIVING (ADL)
ADLS_ACUITY_SCORE: 33
ADLS_ACUITY_SCORE: 35
ADLS_ACUITY_SCORE: 35

## 2024-04-08 ASSESSMENT — COLUMBIA-SUICIDE SEVERITY RATING SCALE - C-SSRS
6. HAVE YOU EVER DONE ANYTHING, STARTED TO DO ANYTHING, OR PREPARED TO DO ANYTHING TO END YOUR LIFE?: NO
2. HAVE YOU ACTUALLY HAD ANY THOUGHTS OF KILLING YOURSELF IN THE PAST MONTH?: NO
1. IN THE PAST MONTH, HAVE YOU WISHED YOU WERE DEAD OR WISHED YOU COULD GO TO SLEEP AND NOT WAKE UP?: NO

## 2024-04-08 NOTE — DISCHARGE INSTRUCTIONS
Thank you for coming to the [facility] ED.  You were seen by the emergency department team.  Your physical examination and x-rays were reassuring against immediately dangerous process.    The final read from radiology is pending on your x-rays.  If there is an unexpected finding that was not appreciated on the initial review you will receive a phone call.    Please use Tylenol and ibuprofen together every 6 hours as needed to control your pain.  We are also prescribing some Valium for muscle spasms.  If you develop new or concerning symptoms such as difficulties with bladder or bowel control, numbness or weakness in your groin or lower extremities or any other symptoms you find concerning please do not hesitate to return to the emergency department for further evaluation.    We hope you have a wonderful trip to [facility]!

## 2024-04-08 NOTE — ED PROVIDER NOTES
"ED Provider Note  [facility]      History     Chief Complaint   Patient presents with    Back Pain     HPI  Bezabeh Assefa is a 69 year old male with history of HTN and HLD presenting to the ED with 3 day history of low back pain. Patient denies known trauma or injury, states that he thinks he may have \"slept wrong\".  He is not having any associated bladder or bowel dysfunction.  No numbness or weakness in lower extremities.  He states he has had an issue with some low back pain in the past that started improving spontaneously.  No fevers no chills.  No other acute medical concerns.    He has an upcoming long-duration trip to [facility] and sought care due to this trip.  If he was not traveling to most likely would not of come to the ED.  Past Medical History  Past Medical History:   Diagnosis Date    History of colonoscopy     Next due 5 years (11/2017)    Hypertension      History reviewed. No pertinent surgical history.  acetaminophen (TYLENOL) 500 MG tablet  diazepam (VALIUM) 5 MG tablet  ibuprofen (ADVIL/MOTRIN) 200 MG tablet  hydrochlorothiazide (HYDRODIURIL) 25 MG tablet  lisinopril (ZESTRIL) 20 MG tablet  sildenafil (VIAGRA) 100 MG tablet  simvastatin (ZOCOR) 20 MG tablet      No Known Allergies  Family History  Family History   Problem Relation Age of Onset    Hypertension Mother     Lipids Mother     Glaucoma No family hx of     Macular Degeneration No family hx of      Social History   Social History     Tobacco Use    Smoking status: Every Day     Packs/day: 0.50     Years: 30.00     Additional pack years: 0.00     Total pack years: 15.00     Types: Cigarettes    Smokeless tobacco: Never   Vaping Use    Vaping Use: Never used   Substance Use Topics    Alcohol use: Yes     Comment: 1 every 3 - 4 months    Drug use: No         A medically appropriate review of systems was performed with pertinent positives and negatives noted in the HPI, and all other systems negative.    Physical " Exam   BP: 113/77  Pulse: 101  Temp: 98.4  F (36.9  C)  Resp: 18  Weight: 77.1 kg (169 lb 14.4 oz)  SpO2: 99 %  Physical Exam  Vital Signs Reviewed  Gen: Well nourished, well developed, resting comfortably, no acute distress  HEENT: NC/AT, PERRL, EOMI, MMM  Neck: Supple, FROM  CV: Regular Rate  Lungs/Chest: Normal Effort  Abd: Non-distended  MSK/Back: FROM, no visible deformity.  No significant line tenderness.  Minimal tenderness across bilateral lower back  Neuro: A&Ox3, GCS 15, CN II-XII unremarkable. Strength and sensation globally intact.  Ambulatory with stable gait.  Skin: Warm, Dry, Intact, no visible lesions      ED Course, Procedures, & Data      Procedures              Results for orders placed or performed during the hospital encounter of 04/08/24   Lumbar spine XR, 2-3 views     Status: None    Narrative    XR LUMBAR SPINE 2/3 VIEWS  4/8/2024 12:52 PM     HISTORY: low back pain x3 days    COMPARISON: None.       Impression    IMPRESSION: Minimal retrolisthesis at L2-3 and anterolisthesis at  L4-5. Mild right convex curvature. No loss of vertebral body height.  Disc height loss and osteophyte formation at L5-S1.     ACE FULLER MD         SYSTEM ID:  [id number]   XR Sacroiliac Joint G/E 3 Views     Status: None    Narrative    XR SACROILIAC JOINT G/E 3 VIEWS  4/8/2024 12:51 PM     HISTORY: Low back pain  COMPARISON: None      Impression    IMPRESSION: No acute fracture or malalignment. Mild degenerative  changes of the bilateral hip and sacroiliac joints. No definite  erosive change. Moderate to severe degenerative changes at the  lumbosacral junction.     LENA MONROE MD         SYSTEM ID:  [id number]   UA with Microscopic reflex to Culture     Status: Abnormal    Specimen: Urine, NOS   Result Value Ref Range    Color Urine Yellow Colorless, Straw, Light Yellow, Yellow    Appearance Urine Clear Clear    Glucose Urine Negative Negative mg/dL    Bilirubin Urine Negative Negative    Ketones Urine  Negative Negative mg/dL    Specific Gravity Urine 1.027 1.003 - 1.035    Blood Urine Negative Negative    pH Urine 5.5 5.0 - 7.0    Protein Albumin Urine 10 (A) Negative mg/dL    Urobilinogen Urine 2.0 Normal, 2.0 mg/dL    Nitrite Urine Negative Negative    Leukocyte Esterase Urine Negative Negative    Mucus Urine Present (A) None Seen /LPF    RBC Urine 1 <=2 /HPF    WBC Urine 1 <=5 /HPF    Hyaline Casts Urine 3 (H) <=2 /LPF    Narrative    Urine Culture not indicated     Medications   acetaminophen (TYLENOL) tablet 1,000 mg (1,000 mg Oral $Given 4/8/24 1115)   ibuprofen (ADVIL/MOTRIN) tablet 600 mg (600 mg Oral $Given 4/8/24 1115)     Labs Ordered and Resulted from Time of ED Arrival to Time of ED Departure   ROUTINE UA WITH MICROSCOPIC REFLEX TO CULTURE - Abnormal       Result Value    Color Urine Yellow      Appearance Urine Clear      Glucose Urine Negative      Bilirubin Urine Negative      Ketones Urine Negative      Specific Gravity Urine 1.027      Blood Urine Negative      pH Urine 5.5      Protein Albumin Urine 10 (*)     Urobilinogen Urine 2.0      Nitrite Urine Negative      Leukocyte Esterase Urine Negative      Mucus Urine Present (*)     RBC Urine 1      WBC Urine 1      Hyaline Casts Urine 3 (*)      Lumbar spine XR, 2-3 views   Final Result   IMPRESSION: Minimal retrolisthesis at L2-3 and anterolisthesis at   L4-5. Mild right convex curvature. No loss of vertebral body height.   Disc height loss and osteophyte formation at L5-S1.       ACE FULLER MD            SYSTEM ID:  [id number]      XR Sacroiliac Joint G/E 3 Views   Final Result   IMPRESSION: No acute fracture or malalignment. Mild degenerative   changes of the bilateral hip and sacroiliac joints. No definite   erosive change. Moderate to severe degenerative changes at the   lumbosacral junction.       LENA MONROE MD            SYSTEM ID:  [id number]             Critical care was not performed.     Medical Decision Making  The  patient's presentation was of moderate complexity (an undiagnosed new problem with uncertain diagnosis).    The patient's evaluation involved:  ordering and/or review of 3+ test(s) in this encounter (see separate area of note for details)  independent interpretation of testing performed by another health professional (see separate area of note for details)    The patient's management necessitated moderate risk (prescription drug management including medications given in the ED).    Assessment & Plan    Bezabeh Assefa is a 69 year old male with history of HTN and HLD presenting to the ED with 3 day history of low back pain.  On arrival patient has reassuring vital signs.  He had a minimal tachycardia that improved with pain control.  He has no concerning neurologic signs symptoms on examination.  No red flag symptoms for cauda equina.  Aside from age no real concerning symptoms regarding back imaging.  After shared decision making agreed to proceed with lumbar and sacroiliac x-rays.  Patient received Tylenol ibuprofen for pain.  Had some initial improvement but feels like his symptoms exacerbated he was moving around for x-rays.  Patient stated he needed to get back to work and did not wish to remain for final radiology reads.  I independently reviewed the x-rays and did not appreciate any obvious fracture or dislocation.  Patient is aware more final read will come later.    I discussed management with Tylenol and ibuprofen scheduled, will provide Valium for antispasmodic.  We discussed that physical therapy would be the best referral for him but with him leaving the country shortly he does not think a referral right now would be beneficial.  I discussed following up with primary care and/or physical therapy and Bernie if one is available.  Provided resources including exercises.  Provided return precautions for worsening pain or new neurologic symptoms.  Patient verbalizes understanding agreement proposed  outpatient discharge plan.    I have reviewed the nursing notes. I have reviewed the findings, diagnosis, plan and need for follow up with the patient.    Discharge Medication List as of 4/8/2024 12:54 PM        START taking these medications    Details   acetaminophen (TYLENOL) 500 MG tablet Take 2 tablets (1,000 mg) by mouth every 6 hours as needed, Disp-112 tablet, R-0, E-Prescribe      diazepam (VALIUM) 5 MG tablet Take 1 tablet (5 mg) by mouth every 6 hours as needed for muscle spasms, Disp-12 tablet, R-0, E-Prescribe      ibuprofen (ADVIL/MOTRIN) 200 MG tablet Take 2 tablets (400 mg) by mouth every 6 hours as needed for pain, Disp-112 tablet, R-0, E-Prescribe             Final diagnoses:   Acute bilateral low back pain without sciatica       Dileep Srinivasan Jr., MD   [facility] EMERGENCY DEPARTMENT  4/8/2024     Dileep Srinivasan MD  04/08/24 [id number]

## 2024-04-09 ENCOUNTER — PRE VISIT (OUTPATIENT)
Dept: NEUROSURGERY | Facility: CLINIC | Age: 69
End: 2024-04-09

## 2024-04-09 ENCOUNTER — OFFICE VISIT (OUTPATIENT)
Dept: NEUROSURGERY | Facility: CLINIC | Age: 69
End: 2024-04-09
Payer: COMMERCIAL

## 2024-04-09 VITALS — SYSTOLIC BLOOD PRESSURE: 109 MMHG | OXYGEN SATURATION: 99 % | HEART RATE: 87 BPM | DIASTOLIC BLOOD PRESSURE: 74 MMHG

## 2024-04-09 DIAGNOSIS — M54.50 ACUTE BILATERAL LOW BACK PAIN WITHOUT SCIATICA: Primary | ICD-10-CM

## 2024-04-09 PROCEDURE — G2211 COMPLEX E/M VISIT ADD ON: HCPCS | Performed by: PHYSICIAN ASSISTANT

## 2024-04-09 PROCEDURE — 99204 OFFICE O/P NEW MOD 45 MIN: CPT | Performed by: PHYSICIAN ASSISTANT

## 2024-04-09 RX ORDER — METHYLPREDNISOLONE 4 MG
TABLET, DOSE PACK ORAL
Qty: 21 TABLET | Refills: 0 | Status: SHIPPED | OUTPATIENT
Start: 2024-04-09 | End: 2024-09-18

## 2024-04-09 ASSESSMENT — PAIN SCALES - GENERAL: PAINLEVEL: WORST PAIN (10)

## 2024-04-09 NOTE — LETTER
4/9/2024         RE: Bezabeh Assefa  [address]  [place] [address]        Dear Colleague,    Thank you for referring your patient, Bezabeh Assefa, to the [facility] NEUROLOGY CLINICS [facility]. Please see a copy of my visit note below.    Neurosurgery Consult    HPI    Mr. Chester is a 69-year-old male who presents with about 5 days of low back pain.  Intermittent pains into his posterior thighs and occasionally right medial thigh.  Initially was more on the right now some on the left.  He denies numbness or weakness, denies bowel or bladder symptoms.  Symptoms began randomly, and are intermittent and random.  He has not tried steroids or physical therapy.  He is going on a month-long trip to [facility] at the end of the week.    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 Mild right convex curvature. No loss of vertebral body height.  Disc height loss and osteophyte formation at L5-S1.   Assessment    Acute back pain without numbness or weakness, patient does have mild intermittent radiculopathy    Plan:      I prescribed the patient a Medrol Dosepak, I also would recommend physical therapy, but since the patient is leaving in a week he will not be able to do that at this time.  If he would like to do that when he returns it certainly reasonable.  He can follow-up with us as needed if he is not improving.    Again, thank you  for allowing me to participate in the care of your patient.        Sincerely,        Brenda Castellano PA-C

## 2024-04-09 NOTE — NURSING NOTE
"Bezabeh Assefa is a 69 year old male who presents for:  Chief Complaint   Patient presents with    Consult     Low back pain, comes around front to the hips. Pain lvl 10.        Initial Vitals:  /74   Pulse 87   SpO2 99%  Estimated body mass index is 25.16 kg/m  as calculated from the following:    Height as of 6/16/23: 5' 8.9\" (1.75 m).    Weight as of 4/8/24: 169 lb 14.4 oz (77.1 kg).. There is no height or weight on file to calculate BSA. BP completed using cuff size: regular  Worst Pain (10)      Emelina Mccallum    "

## 2024-04-09 NOTE — TELEPHONE ENCOUNTER
SPINE PATIENTS - NEW PROTOCOL PREVISIT    RECORDS RECEIVED FROM: SELF refer/ED Follow up   REASON FOR VISIT: LOW BACK PAIN    PROVIDER: Brenda   DATE OF APPT: 04/09/2024   NOTES (FOR ALL VISITS) STATUS DETAILS   OFFICE NOTE from referring provider Internal No referral, ED notes in chart   OFFICE NOTE from other specialist N/A    DISCHARGE SUMMARY from hospital N/A    DISCHARGE REPORT from ER N/A    OPERATIVE REPORT N/A    EMG REPORT N/A    MEDICATION LIST N/A    IMAGING  (FOR ALL VISITS)     MRI (HEAD, NECK, SPINE) N/A    XRAY (SPINE) *NEUROSURGERY* internal XR lumbar/sac 04/08/2024   CT (HEAD, NECK, SPINE) N/A

## 2024-04-10 NOTE — PROGRESS NOTES
Neurosurgery Consult    HPI    Mr. Chester is a 69-year-old male who presents with about 5 days of low back pain.  Intermittent pains into his posterior thighs and occasionally right medial thigh.  Initially was more on the right now some on the left.  He denies numbness or weakness, denies bowel or bladder symptoms.  Symptoms began randomly, and are intermittent and random.  He has not tried steroids or physical therapy.  He is going on a month-long trip to [facility] at the end of the week.    Medical history  Hyperlipidemia  Hypertension  Tobacco use    Social history  Works for [employer] doing a [employer] job at home      B/P: 109/74, T: Data Unavailable, P: 87, R: Data Unavailable       Exam    Alert and oriented no acute distress  Bilateral lower extremities with 5/5 strength  Reflexes 2+ patella/ankle  Negative straight leg raise bilaterally  Negative ankle clonus negative Babinski bilaterally  Lumbar spine nontender to palpation  Able to stand on heels and toes  Gait is normal    Imaging    Lumbar x-ray demonstrated      IMPRESSION: Minimal retrolisthesis at L2-3 and anterolisthesis at  L4-5. Mild right convex curvature. No loss of vertebral body height.  Disc height loss and osteophyte formation at L5-S1.   Assessment    Acute back pain without numbness or weakness, patient does have mild intermittent radiculopathy    Plan:      I prescribed the patient a Medrol Dosepak, I also would recommend physical therapy, but since the patient is leaving in a week he will not be able to do that at this time.  If he would like to do that when he returns it certainly reasonable.  He can follow-up with us as needed if he is not improving.

## 2024-08-05 NOTE — PROGRESS NOTES
Patient seen at the request of No ref. provider found for an opinion and evaluation of lumbar radiculopathy.        HISTORY OF PRESENT ILLNESS:  Bezabeh Assefa is a 69 year old male who presents with a chief complaint of lumbar radiculopathy.       He was seen today in the clinic. He states that 4 months ago he woke up with excruciating pain and was unable to get out of bed.  There was no inciting accident or injury.  Over the last 4 months the pain has remained fairly constant.  He says that in the past he experienced back spasms once in a while, but denies a history of chronic low back pain or radicular pain.    He traveled out of the country to [facility] earlier this year.  While there, he met with a physical therapist and was provided with home exercise program.  However, he has not been able to do the home exercise program due to severe pain with trying to exercise.  He also has not been able to do his typical exercise routine due to pain.      Today, he describes  -Pain radiating from the right buttocks to the front or back of the right thigh, right knee, and right shin (not past the ankle)  -Occasionally pain radiates to the right groin  -Occasional, less severe left lower extremity pain in the same distribution    He describes the lower extremity discomfort as pain and occasionally a burning sensation, but no numbness or tingling.      Aggravating factors include: Lying down, standing, walking, exercise, coughing/sneezing, laying supine, laying prone  Relieving factors include: Sitting    Today, he rates the pain 8/10.  Patient states sleep is affected.    He denies falls, weakness, bowel or bladder incontinence, saddle anesthesia, unintentional weight loss, no current fevers/chills or night sweats.         PRIOR INJURIES/TREATMENT:   Ice/Heat: + Uses the sauna & whirlpool at his gym  Brace: wearing lumbar brace occasionally    Physical Therapy:   Saw PT out of the country for 1 visit in May but unable to do  the home exercise program due to significant pain      - Current Pain Medications -   Absorbine menthol gel - temporary relief    - Prior/Trialed Pain Medications -   tylenol  Ibuprofen  Valium (from ED for air travel to [facility], was helpful for sleep)  Medrol dose pack Rx 4/9/24 - no effect      Prior Procedures:  Date    Procedure   Improvement (%)  none              Prior Related Surgery: none     Other (acupuncture, OMT, CMM, TENS, DME, etc.): none    Specialists Seen - (with most recent, available notes and clinic visits reviewed)   1. GUERRERO - Gray      IMAGING - reviewed   XR LUMBAR SPINE 2/3 VIEWS  4/8/2024   IMPRESSION: Minimal retrolisthesis at L2-3 and anterolisthesis at  L4-5. Mild right convex curvature. No loss of vertebral body height.  Disc height loss and osteophyte formation at L5-S1.       XR SACROILIAC JOINT G/E 3 VIEWS  4/8/2024  IMPRESSION: No acute fracture or malalignment. Mild degenerative  changes of the bilateral hip and sacroiliac joints. No definite  erosive change. Moderate to severe degenerative changes at the  lumbosacral junction.         Review Of Systems:  I am responding to those symptoms which are directly relevant to the specific indication for my consultation. I recommend that the patient follow up with their primary or referring provider to pursue any other symptoms which may be of concern.       Medical History:  He  has a past medical history of History of colonoscopy and Hypertension.     He  has no past surgical history on file.    Family History  His family history includes Hypertension in his mother; Lipids in his mother.     Social History:  Work: for [employer], works from home, sitting most of the workday  Current living situation: Lives alone.   He  reports that he has been smoking cigarettes. He has a 15 pack-year smoking history. He has never used smokeless tobacco. He reports current alcohol use. He reports that he does not use drugs.         Current Medications:   He has a current medication list which includes the following prescription(s): hydrochlorothiazide, lisinopril, methylprednisolone, sildenafil, and simvastatin.     Allergies:   No Known Allergies    PHYSICAL EXAMINATION:  /75   Pulse 96   Wt 77.6 kg (171 lb 1.6 oz)   SpO2 97%   BMI 25.34 kg/m     --CONSTITUTIONAL: Vital signs as above. No acute distress. The patient is well nourished and well groomed.  --PSYCHIATRIC: The patient is awake, alert, oriented to person, place, time and answering questions appropriately with clear speech. Appropriate mood and affect   --SKIN:  Posterior torso is clean, dry, intact without rashes.   --RESPIRATORY: Normal rhythm and effort. No abnormal accessory muscle breathing patterns noted.   --GROSS MOTOR: Easily arises from a seated position. Toe walking and heel walking are normal.  Standing at the counter for stability, is able to stand on one leg and perform 5 reps lifting onto toes on both sides  --STANDING EXAMINATION: Gait is non-antalgic. Normal lumbar lordosis noted, no lateral shift.  --LOWER EXTREMITY MOTOR TESTING:  Plantar flexion left 5/5, right 5/5   Dorsiflexion left 5/5, right 5/5   Great toe MTP extension left 5/5, right 5/5  Knee flexion left 5/5, right 5/5  Knee extension left 5/5, right 5/5   Hip flexion left 5/5, right 5/5  --MUSCULOSKELETAL: Lumbar spine inspection reveals no evidence of deformity. Range of motion is not limited in lumbar flexion, extension, lateral rotation.  Lumbar extension and lateral rotation elicit pain.  No tenderness to palpation lumbar spine. Straight leg raise positive on the right and negative on the left. Sciatic notch non-tender. Facet loading maneuvers are positive bilaterally L>R.  Seated slump test negative bilaterally.  Laying supine on exam table elicits significant back and right lower extremity pain.  Gluteal muscular fullness noted R>L  --SACROILIAC JOINT: No pain with palpation of SI  joint. Giulia negative.   --HIPS: Full range of motion bilaterally. FABERs negative bilaterally. No pain with logrolling. No pain with palpation of the greater trochanters.   --NEUROLOGIC: 2/4 patellar and achilles reflexes bilaterally.  Sensation to light touch is intact in the bilateral L4, L5, and S1 dermatomes.  No clonus.    --VASCULAR:  Warm lower limbs bilaterally. There is no pitting edema of the bilateral lower extremities.       ASSESSMENT:  Bezabeh Assefa is a pleasant 69 year old male who presents with a 4-month history of atraumatic pain which he describes as:  -Pain radiating from the right buttocks to the front or back of the right thigh, right knee, and right shin (not past the ankle)  -Occasionally pain radiates to the right groin  -Occasional, less severe left lower extremity pain in the same distribution    Differential includes lumbar disc bulge/herniation, piriformis related etiology, right intra-articular hip pain          PLAN:  -X-rays of the lumbar spine and SI joints reviewed  -Given inability to proceed with a home exercise program due to significant pain, will obtain MRI of the lumbar spine  -We discussed activity limitations including limiting lifting to 10 pounds and avoiding excessive/repetitive bending and twisting  -We discussed that once the pain is under better control we will add a referral to PT to establish home exercise program  -He did not experience any relief after completing a Medrol Dosepak  -Add cyclobenzaprine 5 mg up to TID as needed to help with muscle spasm component of the pain.  We reviewed that this muscle relaxer can cause drowsiness and to avoid activities which require concentration, including driving and that this medicine should not be taken with alcohol. Patient expressed understanding.  -Add gabapentin, 300 mg, which can be slowly increased up to 3 times a day as tolerated.  We reviewed the chart and the visit summary regarding the titration schedule and he  expressed understanding  -Pending the MRI results, he is interested in an epidural steroid injection  -I asked him to make an appointment to follow-up with me at least 1 day after the MRI to review the findings and discuss next steps  -RTC for review of MRI       Ready to learn, no apparent learning barriers.  Education provided on treatment plan according to patient's preferred learning style.  Patient verbalizes understanding.   __________________________________  Theresa Connor NP  Physical Medicine & Rehabilitation        41 minutes spent by me on the date of the encounter doing chart review, history and exam, documentation and further activities per the note

## 2024-08-06 ENCOUNTER — OFFICE VISIT (OUTPATIENT)
Dept: PHYSICAL MEDICINE AND REHAB | Facility: CLINIC | Age: 69
End: 2024-08-06
Payer: COMMERCIAL

## 2024-08-06 ENCOUNTER — ANCILLARY PROCEDURE (OUTPATIENT)
Dept: MRI IMAGING | Facility: CLINIC | Age: 69
End: 2024-08-06
Attending: NURSE PRACTITIONER
Payer: COMMERCIAL

## 2024-08-06 VITALS
WEIGHT: 171.1 LBS | BODY MASS INDEX: 25.34 KG/M2 | SYSTOLIC BLOOD PRESSURE: 112 MMHG | DIASTOLIC BLOOD PRESSURE: 75 MMHG | HEART RATE: 96 BPM | OXYGEN SATURATION: 97 %

## 2024-08-06 DIAGNOSIS — M54.16 LUMBAR RADICULOPATHY: Primary | ICD-10-CM

## 2024-08-06 DIAGNOSIS — M54.16 LUMBAR RADICULOPATHY: ICD-10-CM

## 2024-08-06 DIAGNOSIS — M62.838 MUSCLE SPASM: ICD-10-CM

## 2024-08-06 DIAGNOSIS — M54.41 ACUTE RIGHT-SIDED LOW BACK PAIN WITH RIGHT-SIDED SCIATICA: ICD-10-CM

## 2024-08-06 PROCEDURE — 99204 OFFICE O/P NEW MOD 45 MIN: CPT | Performed by: NURSE PRACTITIONER

## 2024-08-06 PROCEDURE — 72148 MRI LUMBAR SPINE W/O DYE: CPT | Mod: GC | Performed by: RADIOLOGY

## 2024-08-06 RX ORDER — CYCLOBENZAPRINE HCL 5 MG
5 TABLET ORAL 3 TIMES DAILY PRN
Qty: 30 TABLET | Refills: 0 | Status: SHIPPED | OUTPATIENT
Start: 2024-08-06 | End: 2024-09-18

## 2024-08-06 RX ORDER — GABAPENTIN 300 MG/1
300 CAPSULE ORAL 3 TIMES DAILY
Qty: 90 CAPSULE | Refills: 3 | Status: SHIPPED | OUTPATIENT
Start: 2024-08-06

## 2024-08-06 ASSESSMENT — PAIN SCALES - GENERAL: PAINLEVEL: EXTREME PAIN (8)

## 2024-08-06 NOTE — PATIENT INSTRUCTIONS
It was a pleasure seeing you in the clinic today.  As discussed, we made the following updates to your plan of care:         A MRI order was added today, which you can schedule after today's visit.      Cyclobenzaprine was prescribed today.  This is a muscle relaxer that may be taken for muscle tightness and pain as needed.  This drug can cause sedation / drowsiness, so when taking this medication avoid driving or other tasks that require you to be alert. Do not drink alcohol while taking this medication.       Gabapentin (Neurontin) was prescribed that you can slowly increase as tolerated, according to the chart below:           AM        PM       BEDTIME    Day 0-5         -            -             300mg  Day 5-10      300       -              300mg  Day 10+       300       300         300mg    Continue to increase your dose as discussed above if you are not experiencing any side effects (in other words - if you experience side effects do not progress to the next week). If you are experiencing any side effects, return to the previous dose that was tolerable and continue until follow-up.     The most common side effect is sedation. Do not drive a motor vehicle until after you are familiar with how the medication may impact your attention and reaction.    Note that it may take several weeks to notice the benefits of this medication.   Do not abruptly stopped this medication prior to consulting with a physician.          Please schedule follow up with me after MRI. (Please schedule the follow up at least 1 day after the MRI to give radiology time to do the report as well.)     Thank you,  Theresa Connor NP  Physical Medicine and Rehabilitation, Medical Spine  PM&R clinic Phone: [phone]  PM&R clinic Fax: [phone]

## 2024-08-06 NOTE — LETTER
8/6/2024       RE: Bezabeh Assefa  [address]  [facility] [address]       Dear Colleague,    Thank you for referring your patient, Bezabeh Assefa, to the [facility] PHYSICAL MEDICINE AND REHABILITATION CLINIC [place] at [facility]. Please see a copy of my visit note below.    Patient seen at the request of No ref. provider found for an opinion and evaluation of lumbar radiculopathy.        HISTORY OF PRESENT ILLNESS:  Bezabeh Assefa is a 69 year old male who presents with a chief complaint of lumbar radiculopathy.       He was seen today in the clinic. He states that 4 months ago he woke up with excruciating pain and was unable to get out of bed.  There was no inciting accident or injury.  Over the last 4 months the pain has remained fairly constant.  He says that in the past he experienced back spasms once in a while, but denies a history of chronic low back pain or radicular pain.    He traveled out of the country to [facility] earlier this year.  While there, he met with a physical therapist and was provided with home exercise program.  However, he has not been able to do the home exercise program due to severe pain with trying to exercise.  He also has not been able to do his typical exercise routine due to pain.      Today, he describes  -Pain radiating from the right buttocks to the front or back of the right thigh, right knee, and right shin (not past the ankle)  -Occasionally pain radiates to the right groin  -Occasional, less severe left lower extremity pain in the same distribution    He describes the lower extremity discomfort as pain and occasionally a burning sensation, but no numbness or tingling.      Aggravating factors include: Lying down, standing, walking, exercise, coughing/sneezing, laying supine, laying prone  Relieving factors include: Sitting    Today, he rates the pain 8/10.  Patient states sleep is affected.    He denies  falls, weakness, bowel or bladder incontinence, saddle anesthesia, unintentional weight loss, no current fevers/chills or night sweats.         PRIOR INJURIES/TREATMENT:   Ice/Heat: + Uses the sauna & whirlpool at his gym  Brace: wearing lumbar brace occasionally    Physical Therapy:   Saw PT out of the country for 1 visit in May but unable to do the home exercise program due to significant pain      - Current Pain Medications -   Absorbine menthol gel - temporary relief    - Prior/Trialed Pain Medications -   tylenol  Ibuprofen  Valium (from ED for air travel to [facility]", was helpful for sleep)  Medrol dose pack Rx 4/9/24 - no effect      Prior Procedures:  Date    Procedure   Improvement (%)  none              Prior Related Surgery: none     Other (acupuncture, OMT, CMM, TENS, DME, etc.): none    Specialists Seen - (with most recent, available notes and clinic visits reviewed)   1. GUERRERO - Gray      IMAGING - reviewed   XR LUMBAR SPINE 2/3 VIEWS  4/8/2024   IMPRESSION: Minimal retrolisthesis at L2-3 and anterolisthesis at  L4-5. Mild right convex curvature. No loss of vertebral body height.  Disc height loss and osteophyte formation at L5-S1.       XR SACROILIAC JOINT G/E 3 VIEWS  4/8/2024  IMPRESSION: No acute fracture or malalignment. Mild degenerative  changes of the bilateral hip and sacroiliac joints. No definite  erosive change. Moderate to severe degenerative changes at the  lumbosacral junction.         Review Of Systems:  I am responding to those symptoms which are directly relevant to the specific indication for my consultation. I recommend that the patient follow up with their primary or referring provider to pursue any other symptoms which may be of concern.       Medical History:  He  has a past medical history of History of colonoscopy and Hypertension.     He  has no past surgical history on file.    Family History  His family history includes Hypertension in his mother; Lipids in his mother.      Social History:  Work: for [employer], works from home, sitting most of the workday  Current living situation: Lives alone.   He  reports that he has been smoking cigarettes. He has a 15 pack-year smoking history. He has never used smokeless tobacco. He reports current alcohol use. He reports that he does not use drugs.        Current Medications:   He has a current medication list which includes the following prescription(s): hydrochlorothiazide, lisinopril, methylprednisolone, sildenafil, and simvastatin.     Allergies:   No Known Allergies    PHYSICAL EXAMINATION:  /75   Pulse 96   Wt 77.6 kg (171 lb 1.6 oz)   SpO2 97%   BMI 25.34 kg/m     --CONSTITUTIONAL: Vital signs as above. No acute distress. The patient is well nourished and well groomed.  --PSYCHIATRIC: The patient is awake, alert, oriented to person, place, time and answering questions appropriately with clear speech. Appropriate mood and affect   --SKIN:  Posterior torso is clean, dry, intact without rashes.   --RESPIRATORY: Normal rhythm and effort. No abnormal accessory muscle breathing patterns noted.   --GROSS MOTOR: Easily arises from a seated position. Toe walking and heel walking are normal.  Standing at the counter for stability, is able to stand on one leg and perform 5 reps lifting onto toes on both sides  --STANDING EXAMINATION: Gait is non-antalgic. Normal lumbar lordosis noted, no lateral shift.  --LOWER EXTREMITY MOTOR TESTING:  Plantar flexion left 5/5, right 5/5   Dorsiflexion left 5/5, right 5/5   Great toe MTP extension left 5/5, right 5/5  Knee flexion left 5/5, right 5/5  Knee extension left 5/5, right 5/5   Hip flexion left 5/5, right 5/5  --MUSCULOSKELETAL: Lumbar spine inspection reveals no evidence of deformity. Range of motion is not limited in lumbar flexion, extension, lateral rotation.  Lumbar extension and lateral rotation elicit pain.  No tenderness to palpation lumbar spine. Straight  leg raise positive on the right and negative on the left. Sciatic notch non-tender. Facet loading maneuvers are positive bilaterally L>R.  Seated slump test negative bilaterally.  Laying supine on exam table elicits significant back and right lower extremity pain.  Gluteal muscular fullness noted R>L  --SACROILIAC JOINT: No pain with palpation of SI joint. Giulia negative.   --HIPS: Full range of motion bilaterally. FABERs negative bilaterally. No pain with logrolling. No pain with palpation of the greater trochanters.   --NEUROLOGIC: 2/4 patellar and achilles reflexes bilaterally.  Sensation to light touch is intact in the bilateral L4, L5, and S1 dermatomes.  No clonus.    --VASCULAR:  Warm lower limbs bilaterally. There is no pitting edema of the bilateral lower extremities.       ASSESSMENT:  Bezabeh Assefa is a pleasant 69 year old male who presents with a 4-month history of atraumatic pain which he describes as:  -Pain radiating from the right buttocks to the front or back of the right thigh, right knee, and right shin (not past the ankle)  -Occasionally pain radiates to the right groin  -Occasional, less severe left lower extremity pain in the same distribution    Differential includes lumbar disc bulge/herniation, piriformis related etiology, right intra-articular hip pain          PLAN:  -X-rays of the lumbar spine and SI joints reviewed  -Given inability to proceed with a home exercise program due to significant pain, will obtain MRI of the lumbar spine  -We discussed activity limitations including limiting lifting to 10 pounds and avoiding excessive/repetitive bending and twisting  -We discussed that once the pain is under better control we will add a referral to PT to establish home exercise program  -He did not experience any relief after completing a Medrol Dosepak  -Add cyclobenzaprine 5 mg up to TID as needed to help with muscle spasm component of the pain.  We reviewed that this muscle relaxer can  cause drowsiness and to avoid activities which require concentration, including driving and that this medicine should not be taken with alcohol. Patient expressed understanding.  -Add gabapentin, 300 mg, which can be slowly increased up to 3 times a day as tolerated.  We reviewed the chart and the visit summary regarding the titration schedule and he expressed understanding  -Pending the MRI results, he is interested in an epidural steroid injection  -I asked him to make an appointment to follow-up with me at least 1 day after the MRI to review the findings and discuss next steps  -RTC for review of MRI       Ready to learn, no apparent learning barriers.  Education provided on treatment plan according to patient's preferred learning style.  Patient verbalizes understanding.   _________________________________      41 minutes spent by me on the date of the encounter doing chart review, history and exam, documentation and further activities per the note         Again, thank you for allowing me to participate in the care of your patient.      Sincerely,    DEON Gardner CNP

## 2024-08-08 ENCOUNTER — TELEPHONE (OUTPATIENT)
Dept: PHYSICAL MEDICINE AND REHAB | Facility: CLINIC | Age: 69
End: 2024-08-08

## 2024-08-08 ENCOUNTER — OFFICE VISIT (OUTPATIENT)
Dept: PHYSICAL MEDICINE AND REHAB | Facility: CLINIC | Age: 69
End: 2024-08-08
Payer: COMMERCIAL

## 2024-08-08 VITALS
BODY MASS INDEX: 25.33 KG/M2 | WEIGHT: 171 LBS | SYSTOLIC BLOOD PRESSURE: 120 MMHG | DIASTOLIC BLOOD PRESSURE: 76 MMHG | HEART RATE: 99 BPM | OXYGEN SATURATION: 99 %

## 2024-08-08 DIAGNOSIS — M54.42 ACUTE BILATERAL LOW BACK PAIN WITH BILATERAL SCIATICA: ICD-10-CM

## 2024-08-08 DIAGNOSIS — M51.26 LUMBAR DISC HERNIATION: Primary | ICD-10-CM

## 2024-08-08 DIAGNOSIS — M54.16 LUMBAR RADICULOPATHY: ICD-10-CM

## 2024-08-08 DIAGNOSIS — M54.41 ACUTE BILATERAL LOW BACK PAIN WITH BILATERAL SCIATICA: ICD-10-CM

## 2024-08-08 DIAGNOSIS — M48.061 SPINAL STENOSIS OF LUMBAR REGION, UNSPECIFIED WHETHER NEUROGENIC CLAUDICATION PRESENT: ICD-10-CM

## 2024-08-08 PROCEDURE — 99214 OFFICE O/P EST MOD 30 MIN: CPT | Performed by: NURSE PRACTITIONER

## 2024-08-08 NOTE — LETTER
8/8/2024       RE: Bezabeh Assefa  [address]  [facility] [address]     Dear Colleague,    Thank you for referring your patient, Bezabeh Assefa, to the [facility] PHYSICAL MEDICINE AND REHABILITATION CLINIC [place] at [facility]. Please see a copy of my visit note below.    PM&R Follow-Up Visit -     Date of Initial Visit: 8/6/2024  LOV: 8/6/2024  TD: 8/8/2024     Recall: Bezabeh Assefa is a 69 year old male who presents with a chief complaint of lumbar radiculopathy.     INTERVAL HISTORY:  Patient was last seen in clinic 8/6/2024. At that visit, the plan was to obtain MRI of the lumbar spine, and trial cyclobenzaprine and gabapentin.    He was seen in the clinic today for review of the MRI.    At the last visit he described:  -Pain radiating from the right buttocks to the front or back of the right thigh, right knee, and right shin (not past the ankle)  -Occasionally pain radiates to the right groin  -Occasional, less severe left lower extremity pain in the same distribution    Since last visit, he picked up the prescriptions for gabapentin cyclobenzaprine and thus far has tried 1 dose of each.  He states that that they were helpful.  He says that he is having slightly less pain overall.  However, he has still been limiting his typical activities and has been sleeping on the floor on his yoga mat is sleeping on his usual firm mattress aggravates the pain.  He otherwise denies any new symptoms since he was seen earlier this week            RECALL HISTORY OF PRESENT ILLNESS:  Bezabeh Assefa is a 69 year old male who presents with a chief complaint of lumbar radiculopathy.       He was seen today in the clinic. He states that 4 months ago he woke up with excruciating pain and was unable to get out of bed.  There was no inciting accident or injury.  Over the last 4 months the pain has remained fairly constant.  He says that in the past he  experienced back spasms once in a while, but denies a history of chronic low back pain or radicular pain.    He traveled out of the country to [facility] earlier this year.  While there, he met with a physical therapist and was provided with home exercise program.  However, he has not been able to do the home exercise program due to severe pain with trying to exercise.  He also has not been able to do his typical exercise routine due to pain.      Today, he describes  -Pain radiating from the right buttocks to the front or back of the right thigh, right knee, and right shin (not past the ankle)  -Occasionally pain radiates to the right groin  -Occasional, less severe left lower extremity pain in the same distribution    He describes the lower extremity discomfort as pain and occasionally a burning sensation, but no numbness or tingling.      Aggravating factors include: Lying down, standing, walking, exercise, coughing/sneezing, laying supine, laying prone  Relieving factors include: Sitting    Today, he rates the pain 8/10.  Patient states sleep is affected.    He denies falls, weakness, bowel or bladder incontinence, saddle anesthesia, unintentional weight loss, no current fevers/chills or night sweats.         PRIOR INJURIES/TREATMENT:   Ice/Heat: + Uses the sauna & whirlpool at his gym  Brace: wearing lumbar brace occasionally    Physical Therapy:   Saw PT out of the country for 1 visit in May but unable to do the home exercise program due to significant pain      - Current Pain Medications -   Gabapentin 300 mg at bedtime (just started titration schedule)  Cyclobenzaprine  Absorbine menthol gel - temporary relief    - Prior/Trialed Pain Medications -   tylenol  Ibuprofen  Valium (from ED for air travel to [facility], was helpful for sleep)  Medrol dose pack Rx 4/9/24 - no effect      Prior Procedures:  Date    Procedure   Improvement (%)  none              Prior Related Surgery: none     Other (acupuncture, OMT,  CMM, TENS, DME, etc.): none    Specialists Seen - (with most recent, available notes and clinic visits reviewed)   1. GUERRERO - Gray      IMAGING - reviewed   MR LUMBAR SPINE W/O CONTRAST  8/6/2024   FINDINGS:  There are 5 lumbar type vertebrae, used for the purposes of this  dictation. . No suspicious marrow lesion. Conus tip at approximately  L1. Normal cauda equina. Nonfocal extraspinal structures. There is  moderate to severe disc height loss at L5-S1 and mild loss at the L3-4  level. Degenerative endplate changes at L5-S1.     Level by level:  L1-L2: No significant spinal canal stenosis or neural foraminal  narrowing.     L2-3: Circumferential disc bulge without spinal canal stenosis or  neural foraminal narrowing.      L3-L4: Large posterior disc extrusion with thickening of the  ligamentum flavum and facet arthropathy causing severe spinal canal  stenosis. Mild bilateral neuroforaminal stenosis.     L4-L5: Mild broad-based posterior disc bulge with ligamentum flavum  thickening and facet arthropathy. Mild-to-moderate bilateral  neuroforaminal stenosis, mild spinal canal stenosis.      L5-S1: Circumferential disc bulge with thickening of the ligamentum  flavum and facet arthropathy. There is a superimposed central disc  protrusion and annular fissure that mildly narrows the spinal canal.  There is moderate right and mild-to-moderate left neural foraminal  stenosis.                                                     IMPRESSION:  Multilevel lumbar spondylosis greatest at L3-4 where there is severe  spinal canal stenosis secondary to a large disc extrusion as detailed  above.        XR LUMBAR SPINE 2/3 VIEWS  4/8/2024   IMPRESSION: Minimal retrolisthesis at L2-3 and anterolisthesis at  L4-5. Mild right convex curvature. No loss of vertebral body height.  Disc height loss and osteophyte formation at L5-S1.       XR SACROILIAC JOINT G/E 3 VIEWS  4/8/2024  IMPRESSION: No acute fracture or malalignment. Mild  degenerative  changes of the bilateral hip and sacroiliac joints. No definite  erosive change. Moderate to severe degenerative changes at the  lumbosacral junction.         Review Of Systems:  I am responding to those symptoms which are directly relevant to the specific indication for my consultation. I recommend that the patient follow up with their primary or referring provider to pursue any other symptoms which may be of concern.       Medical History:  He  has a past medical history of History of colonoscopy and Hypertension.     He  has no past surgical history on file.    Family History  His family history includes Hypertension in his mother; Lipids in his mother.     Social History:  Work: for [employer], works from home, sitting most of the workday  Current living situation: Lives alone.   He  reports that he has been smoking cigarettes. He has a 15 pack-year smoking history. He has never used smokeless tobacco. He reports current alcohol use. He reports that he does not use drugs.        Current Medications:   He has a current medication list which includes the following prescription(s): hydrochlorothiazide, lisinopril, methylprednisolone, sildenafil, and simvastatin.     Allergies:   No Known Allergies    PHYSICAL EXAMINATION:  /76 (BP Location: Right arm, Patient Position: Sitting, Cuff Size: Adult Regular)   Pulse 99   Wt 77.6 kg (171 lb)   SpO2 99%   BMI 25.33 kg/m     --CONSTITUTIONAL: Vital signs as above. No acute distress. The patient is well nourished and well groomed.  --PSYCHIATRIC: The patient is awake, alert, oriented to person, place, time and answering questions appropriately with clear speech. Appropriate mood and affect   --SKIN:  Posterior torso is clean, dry, intact without rashes.   --RESPIRATORY: Normal rhythm and effort. No abnormal accessory muscle breathing patterns noted.   --GROSS MOTOR: Easily arises from a seated position.  Nonantalgic  gait        ASSESSMENT:  Bezabeh Assefa is a pleasant 69 year old male who presents with a 4-month history of atraumatic pain which he describes as:  -Pain radiating from the right buttocks to the front or back of the right thigh, right knee, and right shin (not past the ankle)  -Occasionally pain radiates to the right groin  -Occasional, less severe left lower extremity pain in the same distribution          PLAN:  -MRI of the lumbar spine was reviewed with the patient today.  The imaging shows a disc herniation at the L3-4 level with associated thickening of the ligamentum flavum and facet arthropathy causing severe spinal canal stenosis at this level  -Given the ongoing persistent pain, limiting his ability to work with PT and do home exercise program, he would like to proceed with an interventional procedure for pain.  We discussed L4-5 IL TRIPP at the level below the severe spinal canal stenosis at L3-4.  He was in agreement and would like to proceed.  The case request was added with Dr. Singh at the [place] location according to his preference  -In the interim, we discussed limiting lifting to 10 pounds and avoiding excessive repetitive bending and twisting.  I also placed an order for physical therapy, which ideally he can begin after the pain is under better control, which can also help guide increasing his activity as tolerated  -Patient states that he does not want to consider any surgical intervention - i wants to proceed with conservative management only  -Continue cyclobenzaprine 5 mg up to TID as needed  -At the last visit gabapentin 300 mg was added.  Since he was last seen he has taken 1 dose of gabapentin at bedtime, according to the titration schedule provided last visit.  -RTC for procedure.  We will plan to see him in the clinic 2 weeks after the steroid injection to see how he is doing and talk about next      Ready to learn, no apparent learning barriers.  Education provided on treatment  plan according to patient's preferred learning style.  Patient verbalizes understanding.   __________________________________  Theresa Connor NP  Physical Medicine & Rehabilitation        31 minutes spent by me on the date of the encounter doing chart review, history and exam, documentation and further activities per the note         Again, thank you for allowing me to participate in the care of your patient.      Sincerely,    DEON Gardner CNP

## 2024-08-08 NOTE — PATIENT INSTRUCTIONS
"It was a pleasure seeing you in the clinic today.  As discussed, we made the following updates to your plan of care:     -I added an order for epidural steroid injection with Dr Singh. You will receive a call to help schedule the appointment. I included additional information about the injection below.  Please me know if you have any questions or concerns.    -An order for physical therapy was added today. Physical therapy schedulers should call you in the next few days to schedule an appointment. You may also call [phone] to arrange an appointment at any of the [facility] outpatient physical therapy locations.  You may schedule the PT after the steroid injection, or once the pain is better controlled    -If you develop any \"red flags\" such as new or worsening back or leg pain, numbness, tingling, or weakness, lose control of your bowel or bladder, or develop numbness between your legs/groin area please go to the emergency room for further evaluation      -Let's plan to follow-up 2 weeks after the steroid injection to see how you are doing and talk about next steps      Thank you,  Theresa Connor NP  Physical Medicine and Rehabilitation, Medical Spine  PM&R clinic Phone: [phone]  PM&R clinic Fax: [phone]          Preparing for Spine Injection Therapy              Why Do I Need Spine Injection Therapy?  Your Health Care Provider is recommending spine injection therapy to  help relieve your back and neck pain. This will be in addition to other therapies such as medications and physical therapy. The purpose of these injections is to reduce the amount of inflammation (swelling, pain, heat, redness, loss of body function) around the nerves thus reducing the amount of pain.     The medications you will receive with the injections will include:   Anesthetic - medication to numb the painful area.    Steroid - medication that prevents or reduces swelling and pain (anti-inflammatory).   To reduce your " discomfort during the injection procedure, you will receive a numbing medication injection prior to the placement of the needles. You will be lying on your stomach during the injection procedure. We will use a low-dose x-ray (fluoroscopy) to help guide needle placement.  You must have a  for this procedure. We will need to reschedule your injection if you do not have a  with you.      What are the different types of injections and procedure?  Below, is a brief description of the different types of injections we use to deliver pain medication as close as possible to the nerves in the painful area:    Epidural injection: (picture on the right) Epidural injections place 2 medications in your epidural space.  This is the space alongside your spinal canal (not inside of it). Nerves from your spinal cord pass through this space. The medications will bathe those nerves.       Facet joint injection: These injections place 2 medications into the joints of your neck or spine.   SI joint injection: (picture on the left) deliver pain medications into the Sacroiliac joint that connects the hip bones.   Hip joint injection: deliver pain medication to the joint that connect your hip and femur bones (the femur is the bone in the center of the leg that extends from the knee with the hip).  You will be lying on your side with your affected side up. For example, if we are injecting your left hip, then you will be lying on your right side.   Medial Branch Block injections: This is a test to see if your pain is coming from a specific nerve. This injection is similar to a facet joint injection but contains only the numbing medication.  You will keep a pain score diary for the rest of the day and the following morning after receiving the injection.    Radiofrequency ablation: This is a procedure that uses radio waves and numbing medication to block the nerves that feel pain at the joint. The pain relief effects can last for a  long time, but are not permanent. The procedure is similar to the Medial Branch Block but requires additional testing to ensure that the needle is near the nerve before numbing and ablating it.  Doctors often order sedation for this procedure.  Please see the sections on sedation below.      What Should I Expect if I Receive Sedation? THIS IS ORDERED BY THE PHYSICIAN  This is conscious sedation.  The medications we give to you will help you relax and reduce your anxiety.  You will still be awake for the procedure so we can ask you questions and hear your answers.     What Are My Responsibilities With Sedation?  You must stop eating and drinking 8 (eight) hours before your procedure. You can have clear fluids (water, coffee/tea without milk) up to 2 hours prior to the injections. Nothing by mouth for 2 hours prior to injection.  This includes gum, mints, or chew.  Take your morning medications with a small sip of water.    You must have a  who will check-in with you, and stay in the building while the procedure is underway.  If you do not have a  your sedation will be cancelled.  We will monitor you for at least 30 minutes after the procedure before being discharged home.      What Are the Risks and Complications For This Procedure?  Risks and complication are rare, but can still occur.  You should understand, discuss, and accept these risks before agreeing to the procedure. They include, but are not limited to:  infection  nerve damage   paralysis  injection failure or a need for further injections or additional procedures  continued or worsening of symptoms/pain,   medication reaction,  dural leak (into the hole covering around the spinal cord. This may cause a a spinal headache)  leak of the medication into the spinal canal, nerves, or blood vessel.  death       What do I need to do before the procedure?   If you do not follow these instructions your procedure may be cancelled.  Tell us if you are on  major blood thinners such as Coumadin, Xarelto , Plavix, Eliquis , Pradaxa , or others.    Contact the doctor who prescribed your blood thinner to ask for permission to stop taking it before you have the injection.    Schedule your pain injection procedure after your doctor gave their permission.   We will notify you when to stop and re-start your blood thinner.  Tell us if you have any allergies to contrast dye.  If you do, we may give additional medications to take before the procedure.  Tell us if you are pregnant, or possibly pregnant.  If so, you cannot receive steroid medications or be exposed to fluoroscopic X-rays.  Tell us if you have been sick during the 10 days before the procedure. This includes:   colds   gastrointestinal illness or discomfort  dental sores,   skin infection, or any other type of infection.  Tell us if you have taken antibiotics during the 10 days prior to the procedure.  Do not drink alcohol the night before or on the day of the procedure.  You must shower the night before and on the day of your procedure.  Wear comfortable, clean clothing.  If you have an outside MRI (Magnetic Resonance Imaging photo), please bring it with you.    What Will Happen After the Procedure?  If you did not receive sedation we will monitor you for 15 minutes after the procedure. If you received sedation, we will monitor you for at least 30 minutes after the procedure     How soon can I expect pain relief?  You have received 2 types of medications with your injection:    Anesthetic - numbing medication which only acts for a few hours    Steroid which may take 3-14 days to be effective.   You can expect to feel your normal pain after the anesthetic wears off, until the steroid becomes effective.    How should I care for myself at home?  Get plenty of rest and avoid twisting, bending movements, heavy lifting, or strenuous activity for the first 24 hours. This will help the steroid be more effective. Medial  Branch Block injections will have different discharge instructions.  Those will be discussed at that injection appointment.  Resume your pain medications  Apply ice packs (on for 20 minutes at a time), every 2-3 hours to your injection area for the first 2-3 days to help with pain control.    Avoid heat (pads or water bottles), which can cause the veins to open up, making the steroid less effective. You can use heat after 48 hours.  Take showers only for the first 48 hours.  No baths, hot tubs, swimming, or soaking for 48 hours to reduce the risk of infection.     When should I call the doctor?  Call us if you have any of the following:   Fever more than 100.5 degrees Fahrenheit   Signs of infection   Severe headache   Severe back pain   Increased numbness or weakness in your legs or arms   Loss of bladder or bowel control  Nausea   Other concerns    What is the contact information?  During business hours Monday-Friday 8a-5p call [phone].   After business hours, on weekends and holidays call [phone] and ask for the PM&R doctor on call

## 2024-08-08 NOTE — TELEPHONE ENCOUNTER
Called patient to schedule procedure with Dr. Singh    Date of Procedure: 9/18/24    Arrival time given: Yes: Arrival Time 10am       Procedure Location: [facility]     Verified Location with Patient:  Yes  Address provided to the patient     Pre-op H&P Required:  No: Local anesthesia        Post-Op/Follow Up Appt:  Yes: 10/3/24 @8:20am       Informed patient they will need a  to drive them home:  Yes    Patients : Friend      Patient is aware that pre-op RN from the procedure center will call 2-3 days prior to scheduled procedure to confirm arrival time and review any instructions:  Yes       Additional Comments: N/A        Vanessa Latif MA on 8/8/2024 at 11:08 AM      P: [phone]*

## 2024-08-08 NOTE — PROGRESS NOTES
PM&R Follow-Up Visit -     Date of Initial Visit: 8/6/2024  LOV: 8/6/2024  TD: 8/8/2024     Recall: Bezabeh Assefa is a 69 year old male who presents with a chief complaint of lumbar radiculopathy.     INTERVAL HISTORY:  Patient was last seen in clinic 8/6/2024. At that visit, the plan was to obtain MRI of the lumbar spine, and trial cyclobenzaprine and gabapentin.    He was seen in the clinic today for review of the MRI.    At the last visit he described:  -Pain radiating from the right buttocks to the front or back of the right thigh, right knee, and right shin (not past the ankle)  -Occasionally pain radiates to the right groin  -Occasional, less severe left lower extremity pain in the same distribution    Since last visit, he picked up the prescriptions for gabapentin cyclobenzaprine and thus far has tried 1 dose of each.  He states that that they were helpful.  He says that he is having slightly less pain overall.  However, he has still been limiting his typical activities and has been sleeping on the floor on his yoga mat is sleeping on his usual firm mattress aggravates the pain.  He otherwise denies any new symptoms since he was seen earlier this week            RECALL HISTORY OF PRESENT ILLNESS:  Bezabeh Assefa is a 69 year old male who presents with a chief complaint of lumbar radiculopathy.       He was seen today in the clinic. He states that 4 months ago he woke up with excruciating pain and was unable to get out of bed.  There was no inciting accident or injury.  Over the last 4 months the pain has remained fairly constant.  He says that in the past he experienced back spasms once in a while, but denies a history of chronic low back pain or radicular pain.    He traveled out of the country to [facility] earlier this year.  While there, he met with a physical therapist and was provided with home exercise program.  However, he has not been able to do the home exercise program due to severe pain with trying  to exercise.  He also has not been able to do his typical exercise routine due to pain.      Today, he describes  -Pain radiating from the right buttocks to the front or back of the right thigh, right knee, and right shin (not past the ankle)  -Occasionally pain radiates to the right groin  -Occasional, less severe left lower extremity pain in the same distribution    He describes the lower extremity discomfort as pain and occasionally a burning sensation, but no numbness or tingling.      Aggravating factors include: Lying down, standing, walking, exercise, coughing/sneezing, laying supine, laying prone  Relieving factors include: Sitting    Today, he rates the pain 8/10.  Patient states sleep is affected.    He denies falls, weakness, bowel or bladder incontinence, saddle anesthesia, unintentional weight loss, no current fevers/chills or night sweats.         PRIOR INJURIES/TREATMENT:   Ice/Heat: + Uses the sauna & whirlpool at his gym  Brace: wearing lumbar brace occasionally    Physical Therapy:   Saw PT out of the country for 1 visit in May but unable to do the home exercise program due to significant pain      - Current Pain Medications -   Gabapentin 300 mg at bedtime (just started titration schedule)  Cyclobenzaprine  Absorbine menthol gel - temporary relief    - Prior/Trialed Pain Medications -   tylenol  Ibuprofen  Valium (from ED for air travel to [place], was helpful for sleep)  Medrol dose pack Rx 4/9/24 - no effect      Prior Procedures:  Date    Procedure   Improvement (%)  none              Prior Related Surgery: none     Other (acupuncture, OMT, CMM, TENS, DME, etc.): none    Specialists Seen - (with most recent, available notes and clinic visits reviewed)   1. GUERRERO Castellano      IMAGING - reviewed   MR LUMBAR SPINE W/O CONTRAST  8/6/2024   FINDINGS:  There are 5 lumbar type vertebrae, used for the purposes of this  dictation. . No suspicious marrow lesion. Conus tip at approximately  L1. Normal  cauda equina. Nonfocal extraspinal structures. There is  moderate to severe disc height loss at L5-S1 and mild loss at the L3-4  level. Degenerative endplate changes at L5-S1.     Level by level:  L1-L2: No significant spinal canal stenosis or neural foraminal  narrowing.     L2-3: Circumferential disc bulge without spinal canal stenosis or  neural foraminal narrowing.      L3-L4: Large posterior disc extrusion with thickening of the  ligamentum flavum and facet arthropathy causing severe spinal canal  stenosis. Mild bilateral neuroforaminal stenosis.     L4-L5: Mild broad-based posterior disc bulge with ligamentum flavum  thickening and facet arthropathy. Mild-to-moderate bilateral  neuroforaminal stenosis, mild spinal canal stenosis.      L5-S1: Circumferential disc bulge with thickening of the ligamentum  flavum and facet arthropathy. There is a superimposed central disc  protrusion and annular fissure that mildly narrows the spinal canal.  There is moderate right and mild-to-moderate left neural foraminal  stenosis.                                                     IMPRESSION:  Multilevel lumbar spondylosis greatest at L3-4 where there is severe  spinal canal stenosis secondary to a large disc extrusion as detailed  above.        XR LUMBAR SPINE 2/3 VIEWS  4/8/2024   IMPRESSION: Minimal retrolisthesis at L2-3 and anterolisthesis at  L4-5. Mild right convex curvature. No loss of vertebral body height.  Disc height loss and osteophyte formation at L5-S1.       XR SACROILIAC JOINT G/E 3 VIEWS  4/8/2024  IMPRESSION: No acute fracture or malalignment. Mild degenerative  changes of the bilateral hip and sacroiliac joints. No definite  erosive change. Moderate to severe degenerative changes at the  lumbosacral junction.         Review Of Systems:  I am responding to those symptoms which are directly relevant to the specific indication for my consultation. I recommend that the patient follow up with their primary or  referring provider to pursue any other symptoms which may be of concern.       Medical History:  He  has a past medical history of History of colonoscopy and Hypertension.     He  has no past surgical history on file.    Family History  His family history includes Hypertension in his mother; Lipids in his mother.     Social History:  Work: for [employer] works from home, sitting most of the workday  Current living situation: Lives alone.   He  reports that he has been smoking cigarettes. He has a 15 pack-year smoking history. He has never used smokeless tobacco. He reports current alcohol use. He reports that he does not use drugs.        Current Medications:   He has a current medication list which includes the following prescription(s): hydrochlorothiazide, lisinopril, methylprednisolone, sildenafil, and simvastatin.     Allergies:   No Known Allergies    PHYSICAL EXAMINATION:  /76 (BP Location: Right arm, Patient Position: Sitting, Cuff Size: Adult Regular)   Pulse 99   Wt 77.6 kg (171 lb)   SpO2 99%   BMI 25.33 kg/m     --CONSTITUTIONAL: Vital signs as above. No acute distress. The patient is well nourished and well groomed.  --PSYCHIATRIC: The patient is awake, alert, oriented to person, place, time and answering questions appropriately with clear speech. Appropriate mood and affect   --SKIN:  Posterior torso is clean, dry, intact without rashes.   --RESPIRATORY: Normal rhythm and effort. No abnormal accessory muscle breathing patterns noted.   --GROSS MOTOR: Easily arises from a seated position.  Nonantalgic gait        ASSESSMENT:  Bezabeh Assefa is a pleasant 69 year old male who presents with a 4-month history of atraumatic pain which he describes as:  -Pain radiating from the right buttocks to the front or back of the right thigh, right knee, and right shin (not past the ankle)  -Occasionally pain radiates to the right groin  -Occasional, less severe left lower  extremity pain in the same distribution          PLAN:  -MRI of the lumbar spine was reviewed with the patient today.  The imaging shows a disc herniation at the L3-4 level with associated thickening of the ligamentum flavum and facet arthropathy causing severe spinal canal stenosis at this level  -Given the ongoing persistent pain, limiting his ability to work with PT and do home exercise program, he would like to proceed with an interventional procedure for pain.  We discussed L4-5 IL TRIPP at the level below the severe spinal canal stenosis at L3-4.  He was in agreement and would like to proceed.  The case request was added with Dr. Singh at the [place] location according to his preference  -In the interim, we discussed limiting lifting to 10 pounds and avoiding excessive repetitive bending and twisting.  I also placed an order for physical therapy, which ideally he can begin after the pain is under better control, which can also help guide increasing his activity as tolerated  -Patient states that he does not want to consider any surgical intervention - i wants to proceed with conservative management only  -Continue cyclobenzaprine 5 mg up to TID as needed  -At the last visit gabapentin 300 mg was added.  Since he was last seen he has taken 1 dose of gabapentin at bedtime, according to the titration schedule provided last visit.  -RTC for procedure.  We will plan to see him in the clinic 2 weeks after the steroid injection to see how he is doing and talk about next      Ready to learn, no apparent learning barriers.  Education provided on treatment plan according to patient's preferred learning style.  Patient verbalizes understanding.   __________________________________  Theresa Connor NP  Physical Medicine & Rehabilitation        31 minutes spent by me on the date of the encounter doing chart review, history and exam, documentation and further activities per the note

## 2024-08-18 ENCOUNTER — HEALTH MAINTENANCE LETTER (OUTPATIENT)
Age: 69
End: 2024-08-18

## 2024-08-24 SDOH — HEALTH STABILITY: PHYSICAL HEALTH: ON AVERAGE, HOW MANY MINUTES DO YOU ENGAGE IN EXERCISE AT THIS LEVEL?: 60 MIN

## 2024-08-24 SDOH — HEALTH STABILITY: PHYSICAL HEALTH: ON AVERAGE, HOW MANY DAYS PER WEEK DO YOU ENGAGE IN MODERATE TO STRENUOUS EXERCISE (LIKE A BRISK WALK)?: 3 DAYS

## 2024-08-24 ASSESSMENT — SOCIAL DETERMINANTS OF HEALTH (SDOH): HOW OFTEN DO YOU GET TOGETHER WITH FRIENDS OR RELATIVES?: ONCE A WEEK

## 2024-08-27 ENCOUNTER — OFFICE VISIT (OUTPATIENT)
Dept: FAMILY MEDICINE | Facility: CLINIC | Age: 69
End: 2024-08-27
Attending: FAMILY MEDICINE
Payer: COMMERCIAL

## 2024-08-27 VITALS
HEART RATE: 82 BPM | WEIGHT: 171.5 LBS | RESPIRATION RATE: 14 BRPM | TEMPERATURE: 98.3 F | OXYGEN SATURATION: 100 % | HEIGHT: 70 IN | DIASTOLIC BLOOD PRESSURE: 83 MMHG | BODY MASS INDEX: 24.55 KG/M2 | SYSTOLIC BLOOD PRESSURE: 123 MMHG

## 2024-08-27 DIAGNOSIS — E78.5 HYPERLIPIDEMIA LDL GOAL <130: ICD-10-CM

## 2024-08-27 DIAGNOSIS — I10 HYPERTENSION GOAL BP (BLOOD PRESSURE) < 140/90: ICD-10-CM

## 2024-08-27 DIAGNOSIS — Z00.00 ENCOUNTER FOR MEDICARE ANNUAL WELLNESS EXAM: Primary | ICD-10-CM

## 2024-08-27 DIAGNOSIS — Z12.11 SCREEN FOR COLON CANCER: ICD-10-CM

## 2024-08-27 DIAGNOSIS — M51.26 LUMBAR DISC HERNIATION: ICD-10-CM

## 2024-08-27 DIAGNOSIS — Z12.5 SCREENING FOR PROSTATE CANCER: ICD-10-CM

## 2024-08-27 DIAGNOSIS — F17.200 TOBACCO USE DISORDER: ICD-10-CM

## 2024-08-27 LAB
ALBUMIN SERPL BCG-MCNC: 4.4 G/DL (ref 3.5–5.2)
ALP SERPL-CCNC: 71 U/L (ref 40–150)
ALT SERPL W P-5'-P-CCNC: 23 U/L (ref 0–70)
ANION GAP SERPL CALCULATED.3IONS-SCNC: 11 MMOL/L (ref 7–15)
AST SERPL W P-5'-P-CCNC: 27 U/L (ref 0–45)
BILIRUB SERPL-MCNC: 0.4 MG/DL
BUN SERPL-MCNC: 6.6 MG/DL (ref 8–23)
CALCIUM SERPL-MCNC: 9.5 MG/DL (ref 8.8–10.4)
CHLORIDE SERPL-SCNC: 99 MMOL/L (ref 98–107)
CHOLEST SERPL-MCNC: 120 MG/DL
CREAT SERPL-MCNC: 0.95 MG/DL (ref 0.67–1.17)
CREAT UR-MCNC: 46.6 MG/DL
EGFRCR SERPLBLD CKD-EPI 2021: 87 ML/MIN/1.73M2
FASTING STATUS PATIENT QL REPORTED: ABNORMAL
FASTING STATUS PATIENT QL REPORTED: ABNORMAL
GLUCOSE SERPL-MCNC: 107 MG/DL (ref 70–99)
HCO3 SERPL-SCNC: 26 MMOL/L (ref 22–29)
HDLC SERPL-MCNC: 33 MG/DL
LDLC SERPL CALC-MCNC: 68 MG/DL
MICROALBUMIN UR-MCNC: <12 MG/L
MICROALBUMIN/CREAT UR: NORMAL MG/G{CREAT}
NONHDLC SERPL-MCNC: 87 MG/DL
POTASSIUM SERPL-SCNC: 3.9 MMOL/L (ref 3.4–5.3)
PROT SERPL-MCNC: 7.4 G/DL (ref 6.4–8.3)
PSA SERPL DL<=0.01 NG/ML-MCNC: 1.2 NG/ML (ref 0–4.5)
SODIUM SERPL-SCNC: 136 MMOL/L (ref 135–145)
TRIGL SERPL-MCNC: 96 MG/DL

## 2024-08-27 PROCEDURE — 36415 COLL VENOUS BLD VENIPUNCTURE: CPT | Performed by: FAMILY MEDICINE

## 2024-08-27 PROCEDURE — 82570 ASSAY OF URINE CREATININE: CPT | Performed by: FAMILY MEDICINE

## 2024-08-27 PROCEDURE — G0103 PSA SCREENING: HCPCS | Performed by: FAMILY MEDICINE

## 2024-08-27 PROCEDURE — 99397 PER PM REEVAL EST PAT 65+ YR: CPT | Performed by: FAMILY MEDICINE

## 2024-08-27 PROCEDURE — 80053 COMPREHEN METABOLIC PANEL: CPT | Performed by: FAMILY MEDICINE

## 2024-08-27 PROCEDURE — 82043 UR ALBUMIN QUANTITATIVE: CPT | Performed by: FAMILY MEDICINE

## 2024-08-27 PROCEDURE — 80061 LIPID PANEL: CPT | Performed by: FAMILY MEDICINE

## 2024-08-27 ASSESSMENT — PAIN SCALES - GENERAL: PAINLEVEL: MODERATE PAIN (5)

## 2024-08-27 NOTE — PROGRESS NOTES
Preventive Care Visit  [facility]  Elias Ballesteros MD, Family Medicine  Aug 27, 2024      Assessment & Plan     Encounter for Medicare annual wellness exam  Overall fit but limited by herniate disc  - PRIMARY CARE FOLLOW-UP SCHEDULING  - Comprehensive metabolic panel (BMP + Alb, Alk Phos, ALT, AST, Total. Bili, TP); Future  - Albumin Random Urine Quantitative with Creat Ratio; Future  - Comprehensive metabolic panel (BMP + Alb, Alk Phos, ALT, AST, Total. Bili, TP)  - Albumin Random Urine Quantitative with Creat Ratio    Hypertension goal BP (blood pressure) < 140/90  Well controlled   - Comprehensive metabolic panel (BMP + Alb, Alk Phos, ALT, AST, Total. Bili, TP); Future  - Comprehensive metabolic panel (BMP + Alb, Alk Phos, ALT, AST, Total. Bili, TP)    Screen for colon cancer  Will do  - Colonoscopy Screening  Referral; Future    Hyperlipidemia LDL goal <130  recheck  - Lipid panel reflex to direct LDL Non-fasting; Future  - Lipid panel reflex to direct LDL Non-fasting    Lumbar disc herniation  Better on gabapentin  Follow up with consultant as planned.     Screening for prostate cancer  Sent   - PSA, screen; Future  - PSA, screen    Tobacco use disorder  Urge cessation            Nicotine/Tobacco Cessation  He reports that he has been smoking cigarettes. He has a 15 pack-year smoking history. He has never used smokeless tobacco.  Nicotine/Tobacco Cessation Plan  Information offered: Patient not interested at this time      Counseling  Appropriate preventive services were addressed with this patient via screening, questionnaire, or discussion as appropriate for fall prevention, nutrition, physical activity, Tobacco-use cessation, social engagement, weight loss and cognition.  Checklist reviewing preventive services available has been given to the patient.  Reviewed patient's diet, addressing concerns and/or questions.   He is at risk for lack of exercise  and has been provided with information to increase physical activity for the benefit of his well-being.   The patient was instructed to see the dentist every 6 months.       Regular exercise  See Patient Instructions    Subjective   Evert is a 69 year old, presenting for the following:  Wellness Visit        8/27/2024     1:26 PM   Additional Questions   Roomed by Mindi VALDES        Health Care Directive  Patient does not have a Health Care Directive or Living Will:     HPI      Hypertension Follow-up    Do you check your blood pressure regularly outside of the clinic? Yes   Are you following a low salt diet? Yes  Are your blood pressures ever more than 140 on the top number (systolic) OR more   than 90 on the bottom number (diastolic), for example 140/90? No        8/24/2024   General Health   How would you rate your overall physical health? Good   Feel stress (tense, anxious, or unable to sleep) Not at all            8/24/2024   Nutrition   Diet: Regular (no restrictions)            8/24/2024   Exercise   Days per week of moderate/strenous exercise 3 days   Average minutes spent exercising at this level 60 min            8/24/2024   Social Factors   Frequency of gathering with friends or relatives Once a week   Worry food won't last until get money to buy more No   Food not last or not have enough money for food? No   Do you have housing? (Housing is defined as stable permanent housing and does not include staying ouside in a car, in a tent, in an abandoned building, in an overnight shelter, or couch-surfing.) No   Are you worried about losing your housing? No   Lack of transportation? No   Unable to get utilities (heat,electricity)? No   Want help with housing or utility concern? No      (!) HOUSING CONCERN PRESENT      8/24/2024   Fall Risk   Fallen 2 or more times in the past year? No   Trouble with walking or balance? No             8/24/2024   Activities of Daily Living- Home Safety   Needs help with the following  daily activites None of the above   Safety concerns in the home None of the above            8/24/2024   Dental   Dentist two times every year? (!) NO            8/24/2024   Hearing Screening   Hearing concerns? None of the above            8/24/2024   Driving Risk Screening   Patient/family members have concerns about driving No            8/24/2024   General Alertness/Fatigue Screening   Have you been more tired than usual lately? No            8/24/2024   Urinary Incontinence Screening   Bothered by leaking urine in past 6 months No               Today's PHQ-2 Score:       8/27/2024     1:17 PM   PHQ-2 ( 1999 [employer])   Q1: Little interest or pleasure in doing things 0   Q2: Feeling down, depressed or hopeless 0   PHQ-2 Score 0   Q1: Little interest or pleasure in doing things Not at all   Q2: Feeling down, depressed or hopeless Not at all   PHQ-2 Score 0           8/24/2024   Substance Use   Alcohol more than 3/day or more than 7/wk No   Do you have a current opioid prescription? No   How severe/bad is pain from 1 to 10? 5/10   Do you use any other substances recreationally? No        Social History     Tobacco Use    Smoking status: Every Day     Current packs/day: 0.50     Average packs/day: 0.5 packs/day for 30.0 years (15.0 ttl pk-yrs)     Types: Cigarettes    Smokeless tobacco: Never   Vaping Use    Vaping status: Never Used   Substance Use Topics    Alcohol use: Yes     Comment: 1 every 3 - 4 months    Drug use: No       Last PSA:   PSA   Date Value Ref Range Status   09/03/2020 0.35 0 - 4 ug/L Final     Comment:     Assay Method:  Chemiluminescence using Siemens Vista analyzer     Prostate Specific Antigen Screen   Date Value Ref Range Status   06/16/2023 0.55 0.00 - 4.50 ng/mL Final   12/28/2021 0.40 0.00 - 4.00 ug/L Final     ASCVD Risk   The ASCVD Risk score (Nadege LICEA, et al., 2019) failed to calculate for the following reasons:    The valid total cholesterol range is 130 to 320  "mg/dL            Reviewed and updated as needed this visit by Provider                    Past Medical History:   Diagnosis Date    History of colonoscopy     Next due 5 years (11/2017)    Hypertension      Current providers sharing in care for this patient include:  Patient Care Team:  Elias Ballesteros MD as PCP - Fred Brandt OD as MD (Optometry)  Elias Ballesteros MD as Assigned PCP  Marissa Stevens MD as MD (Ophthalmology)  Brenda Castellano PA-C as Assigned Neuroscience Provider    The following health maintenance items are reviewed in Epic and correct as of today:  Health Maintenance   Topic Date Due    ANNUAL REVIEW OF HM ORDERS  Never done    ZOSTER IMMUNIZATION (1 of 2) Never done    LUNG CANCER SCREENING  Never done    RSV VACCINE (Pregnancy & 60+) (1 - 1-dose 60+ series) Never done    AORTIC ANEURYSM SCREENING (SYSTEM ASSIGNED)  Never done    COLORECTAL CANCER SCREENING  11/12/2022    COVID-19 Vaccine (4 - 2023-24 season) 09/01/2023    NICOTINE/TOBACCO CESSATION COUNSELING Q 1 YR  06/16/2024    MEDICARE ANNUAL WELLNESS VISIT  06/16/2024    LIPID  06/16/2024    INFLUENZA VACCINE (1) 09/01/2024    FALL RISK ASSESSMENT  08/27/2025    GLUCOSE  06/16/2026    ADVANCE CARE PLANNING  06/16/2028    DTAP/TDAP/TD IMMUNIZATION (4 - Td or Tdap) 06/16/2033    HEPATITIS C SCREENING  Completed    PHQ-2 (once per calendar year)  Completed    Pneumococcal Vaccine: 65+ Years  Completed    HPV IMMUNIZATION  Aged Out    MENINGITIS IMMUNIZATION  Aged Out    RSV MONOCLONAL ANTIBODY  Aged Out         Review of Systems  Constitutional, HEENT, cardiovascular, pulmonary, gi and gu systems are negative, except as otherwise noted.     Objective    Exam  /83 (BP Location: Left arm, Patient Position: Sitting, Cuff Size: Adult Regular)   Pulse 82   Temp 98.3  F (36.8  C) (Temporal)   Resp 14   Ht 1.778 m (5' 10\")   Wt 77.8 kg (171 lb 8 oz)   SpO2 100%   BMI 24.61 kg/m   " "  Estimated body mass index is 24.61 kg/m  as calculated from the following:    Height as of this encounter: 1.778 m (5' 10\").    Weight as of this encounter: 77.8 kg (171 lb 8 oz).    Physical Exam  GENERAL: alert and no distress  EYES: Eyes grossly normal to inspection, PERRL and conjunctivae and sclerae normal  HENT: ear canals and TM's normal, nose and mouth without ulcers or lesions  NECK: no adenopathy, no asymmetry, masses, or scars  RESP: lungs clear to auscultation - no rales, rhonchi or wheezes  CV: regular rate and rhythm, normal S1 S2, no S3 or S4, no murmur, click or rub, no peripheral edema  ABDOMEN: soft, nontender, no hepatosplenomegaly, no masses and bowel sounds normal  MS: no gross musculoskeletal defects noted, no edema  SKIN: no suspicious lesions or rashes  NEURO: Normal strength and tone, mentation intact and speech normal  PSYCH: mentation appears normal, affect normal/bright  LYMPH: no cervical, supraclavicular, axillary, or inguinal adenopathy  Diabetic foot exam: normal DP and PT pulses, no trophic changes or ulcerative lesions, and normal sensory exam        8/27/2024   Mini Cog   Clock Draw Score 2 Normal   3 Item Recall 3 objects recalled   Mini Cog Total Score 5                Signed Electronically by: Elias Ballesteros MD    "

## 2024-08-28 ENCOUNTER — MYC REFILL (OUTPATIENT)
Dept: FAMILY MEDICINE | Facility: CLINIC | Age: 69
End: 2024-08-28
Payer: COMMERCIAL

## 2024-08-28 DIAGNOSIS — I10 HYPERTENSION GOAL BP (BLOOD PRESSURE) < 140/90: ICD-10-CM

## 2024-08-28 DIAGNOSIS — E78.5 HYPERLIPIDEMIA LDL GOAL <130: ICD-10-CM

## 2024-08-29 RX ORDER — HYDROCHLOROTHIAZIDE 25 MG/1
25 TABLET ORAL DAILY
Qty: 90 TABLET | Refills: 3 | Status: SHIPPED | OUTPATIENT
Start: 2024-08-29

## 2024-08-29 RX ORDER — LISINOPRIL 20 MG/1
20 TABLET ORAL DAILY
Qty: 90 TABLET | Refills: 3 | Status: SHIPPED | OUTPATIENT
Start: 2024-08-29

## 2024-08-29 RX ORDER — SIMVASTATIN 20 MG
20 TABLET ORAL DAILY
Qty: 90 TABLET | Refills: 3 | Status: SHIPPED | OUTPATIENT
Start: 2024-08-29

## 2024-09-10 ENCOUNTER — TELEPHONE (OUTPATIENT)
Dept: PHYSICAL MEDICINE AND REHAB | Facility: CLINIC | Age: 69
End: 2024-09-10
Payer: COMMERCIAL

## 2024-09-10 ENCOUNTER — MYC MEDICAL ADVICE (OUTPATIENT)
Dept: PHYSICAL MEDICINE AND REHAB | Facility: CLINIC | Age: 69
End: 2024-09-10
Payer: COMMERCIAL

## 2024-09-10 NOTE — TELEPHONE ENCOUNTER
L4-5 Interlaminar epidural steroid injection scheduled with Dr. Singh on 9/18/24 at the [facility] ([facility]      Called patient to review his upcoming procedure, the need to hold Aspirin containing products for 6 days prior to the injection, hold Naproxen for 4 days prior to injection, hold Ibuprofen for 1 day prior to injection, to advise that the [facility] nurse will send information out via My Chart typically 1-3 days before the procedure to review important information that he will need to know for the day of the procedure, and that he cannot have an illness/infection at time of the procedure, that it would need to be rescheduled if that were to occur.      No answer, left patient a voicemail that a message will be sent to him via My Chart and to call the clinic at [phone] with any questions.     SUSHMA KeithN RN  RN Care Coordinator for Physical Medicine and Rehabilitation  [facility] [address] [address]  Office: [phone]  Fax: [phone]

## 2024-09-11 NOTE — TELEPHONE ENCOUNTER
Patient responded to Inmoot message sent by Janeth BRAND RN, on 9/10 acknowledging that he has read the message sent related to medication hold prior to his procedure on 9/18.    Leonarda Parker RN on 9/11/2024 at 9:03 AM

## 2024-09-18 ENCOUNTER — HOSPITAL ENCOUNTER (OUTPATIENT)
Facility: AMBULATORY SURGERY CENTER | Age: 69
Discharge: HOME | End: 2024-09-18
Attending: PHYSICAL MEDICINE & REHABILITATION | Admitting: PHYSICAL MEDICINE & REHABILITATION
Payer: COMMERCIAL

## 2024-09-18 ENCOUNTER — ANCILLARY PROCEDURE (OUTPATIENT)
Dept: RADIOLOGY | Facility: AMBULATORY SURGERY CENTER | Age: 69
End: 2024-09-18
Attending: PHYSICAL MEDICINE & REHABILITATION
Payer: COMMERCIAL

## 2024-09-18 VITALS
DIASTOLIC BLOOD PRESSURE: 73 MMHG | RESPIRATION RATE: 16 BRPM | SYSTOLIC BLOOD PRESSURE: 121 MMHG | OXYGEN SATURATION: 97 %

## 2024-09-18 DIAGNOSIS — R52 PAIN: ICD-10-CM

## 2024-09-18 PROCEDURE — 62323 NJX INTERLAMINAR LMBR/SAC: CPT

## 2024-09-18 RX ORDER — IOPAMIDOL 408 MG/ML
INJECTION, SOLUTION INTRATHECAL DAILY PRN
Status: DISCONTINUED | OUTPATIENT
Start: 2024-09-18 | End: 2024-09-18 | Stop reason: HOSPADM

## 2024-09-18 RX ORDER — DEXAMETHASONE SODIUM PHOSPHATE 10 MG/ML
INJECTION INTRAMUSCULAR; INTRAVENOUS DAILY PRN
Status: DISCONTINUED | OUTPATIENT
Start: 2024-09-18 | End: 2024-09-18 | Stop reason: HOSPADM

## 2024-09-18 RX ORDER — LIDOCAINE HYDROCHLORIDE 10 MG/ML
INJECTION, SOLUTION EPIDURAL; INFILTRATION; INTRACAUDAL; PERINEURAL DAILY PRN
Status: DISCONTINUED | OUTPATIENT
Start: 2024-09-18 | End: 2024-09-18 | Stop reason: HOSPADM

## 2024-09-18 NOTE — DISCHARGE INSTRUCTIONS

## 2024-09-18 NOTE — OP NOTE
PROCEDURE NOTE: Lumbar Interlaminar Epidural Steroid Injection    PROCEDURE DATE: 9/18/2024    PATIENT NAME: Bezabeh Assefa  YOB: 1955    ATTENDING PHYSICIAN: Suresh Singh MD    PREOPERATIVE DIAGNOSIS:   Lumbar radiculopathy   Lumbar spinal stenosis  POSTOPERATIVE DIAGNOSIS: Same    PROCEDURE PERFORMED: Interlaminar Epidural Steroid Injection at the L4-5 level, with a right paramedian approach under fluoroscopic guidance       FLUOROSCOPY WAS USED.     INDICATIONS FOR PROCEDURE:   This is a 69 year old male with a clinical picture consistent with the above-mentioned diagnosis, resulting in radicular pain to the right>>left lower extremity.    08/06/24 MRI Lumbar spine                                                                   IMPRESSION:  Multilevel lumbar spondylosis greatest at L3-4 where there is severe  spinal canal stenosis secondary to a large disc extrusion as detailed  above.    PROCEDURE AND FINDINGS:   The patient was greeted in the pre procedure holding area. The risk, benefits and alternatives to the procedure were again reviewed with the patient and written informed consent was placed in the chart. Prior to the procedure a time out was completed, verifying correct patient, procedure, site, positioning, and implants and/or special equipment.    An IV line was not placed. The patient was taken to the procedure room and positioned prone on the fluoroscopy table. Routine monitors were applied including blood pressure cuff, and pulse oximetry. Then a  film was taken to identify the correct level. The skin was prepped and draped in the usual sterile fashion. The overlying skin and subcutaneous tissue was anesthetized using a 27-guage 1-1/4 inch needle with 1% preservative-free lidocaine for a total volume of  3mls. Then an 20G: 3.5-inch Tuohy needle was advanced under fluorosocpic guidance using AP and lateral views into the L4-5 interlaminar space. A loss of resistance syringe  was attached and loss of resistance to saline was achieved.     After negative aspiration, 1mls of Isovue-M contrast was injected under AP view with live fluoroscopy and confirmed adequate spread along the nerve root and in the epidural space. There was no evidence of intravascular uptake or intrathecal spread on imaging. A lateral view was also taken confirming adequate epidural spread.     At this point, after negative aspiration, a total 4mL volume of treatment injectate, consisting of 1mL Dexamethasone (10mg/mL) and 3mL of preservative free normal saline (PFNS) was injected easily.  The needle was then flushed with 0.5ml of PFNS and removed. The needle insertion site was dressed appropriately.     The patient was taken to the recovery room where they were monitored for a brief period of time. He tolerated the procedure well and were discharged home in stable condition with post procedural instructions.     Follow-up will be in [facility]     COMPLICATIONS: None    COMMENTS: None

## 2024-09-26 NOTE — PROGRESS NOTES
PM&R Follow-Up Visit -     Date of Initial Visit: 8/6/2024  LOV: 9/18/2024 - procedure visit  TD: 10/3/2024     Recall: Bezabeh Assefa is a 69 year old male who presents with a chief complaint of lumbar radiculopathy.       INTERVAL HISTORY:  Patient was last seen 9/18/2024 for L4-5 ILESI with Dr Singh.   He was seen today in the clinic for follow up.   He reports that after the last clinic visit there was a delay in scheduling the steroid injection due to availability.  However, he says that days after being seen in the spine clinic his pain stopped. He started a stretching program and began working with a  at his gym.  He did hear from physical therapy scheduling, but opted to hold off for now.  He says he proceeded with the ILESI. He reports the prior pain he had described (RLE>LLE lower extremity pain) has remained resolved.   Since last time he was seen he also stopped cyclobenzaprine, gabapentin, or any other medication for pain.  He denies weakness, bowel or bladder incontinence, or saddle anesthesia.           RECALL HISTORY OF PRESENT ILLNESS:  Bezabeh Assefa is a 69 year old male who presents with a chief complaint of lumbar radiculopathy.       He was seen today in the clinic. He states that 4 months ago he woke up with excruciating pain and was unable to get out of bed.  There was no inciting accident or injury.  Over the last 4 months the pain has remained fairly constant.  He says that in the past he experienced back spasms once in a while, but denies a history of chronic low back pain or radicular pain.    He traveled out of the country to [facility] earlier this year.  While there, he met with a physical therapist and was provided with home exercise program.  However, he has not been able to do the home exercise program due to severe pain with trying to exercise.  He also has not been able to do his typical exercise routine due to pain.      Today, he describes  -Pain radiating from the right  buttocks to the front or back of the right thigh, right knee, and right shin (not past the ankle)  -Occasionally pain radiates to the right groin  -Occasional, less severe left lower extremity pain in the same distribution    He describes the lower extremity discomfort as pain and occasionally a burning sensation, but no numbness or tingling.      Aggravating factors include: Lying down, standing, walking, exercise, coughing/sneezing, laying supine, laying prone  Relieving factors include: Sitting    Today, he rates the pain 8/10.  Patient states sleep is affected.    He denies falls, weakness, bowel or bladder incontinence, saddle anesthesia, unintentional weight loss, no current fevers/chills or night sweats.       8/8/2024:  Patient was last seen in clinic 8/6/2024. At that visit, the plan was to obtain MRI of the lumbar spine, and trial cyclobenzaprine and gabapentin.    He was seen in the clinic today for review of the MRI.    At the last visit he described:  -Pain radiating from the right buttocks to the front or back of the right thigh, right knee, and right shin (not past the ankle)  -Occasionally pain radiates to the right groin  -Occasional, less severe left lower extremity pain in the same distribution    Since last visit, he picked up the prescriptions for gabapentin cyclobenzaprine and thus far has tried 1 dose of each.  He states that that they were helpful.  He says that he is having slightly less pain overall.  However, he has still been limiting his typical activities and has been sleeping on the floor on his yoga mat is sleeping on his usual firm mattress aggravates the pain.  He otherwise denies any new symptoms since he was seen earlier this week        PRIOR INJURIES/TREATMENT:   Ice/Heat: + Uses the sauna & whirlpool at his gym  Brace: wearing lumbar brace occasionally    Physical Therapy:   Works with a  at his gym  Saw PT out of the country for 1 visit in May but unable to do the home  exercise program due to significant pain      - Current Pain Medications -   None    - Prior/Trialed Pain Medications -   Gabapentin   Cyclobenzaprine  Absorbine menthol gel   tylenol  Ibuprofen  Valium (from ED for air travel to [place], was helpful for sleep)  Medrol dose pack Rx 4/9/24 - no effect      Prior Procedures:  Date    Procedure   Improvement (%)  9/18/2024  L4-5 ILESI   Unclear, pain had improved prior to injection. Reports 100% improvement             Prior Related Surgery: none     Other (acupuncture, OMT, CMM, TENS, DME, etc.): none    Specialists Seen - (with most recent, available notes and clinic visits reviewed)   1. [facility] - [facility]      IMAGING - reviewed   MR LUMBAR SPINE W/O CONTRAST  8/6/2024   FINDINGS:  There are 5 lumbar type vertebrae, used for the purposes of this  dictation. . No suspicious marrow lesion. Conus tip at approximately  L1. Normal cauda equina. Nonfocal extraspinal structures. There is  moderate to severe disc height loss at L5-S1 and mild loss at the L3-4  level. Degenerative endplate changes at L5-S1.     Level by level:  L1-L2: No significant spinal canal stenosis or neural foraminal  narrowing.     L2-3: Circumferential disc bulge without spinal canal stenosis or  neural foraminal narrowing.      L3-L4: Large posterior disc extrusion with thickening of the  ligamentum flavum and facet arthropathy causing severe spinal canal  stenosis. Mild bilateral neuroforaminal stenosis.     L4-L5: Mild broad-based posterior disc bulge with ligamentum flavum  thickening and facet arthropathy. Mild-to-moderate bilateral  neuroforaminal stenosis, mild spinal canal stenosis.      L5-S1: Circumferential disc bulge with thickening of the ligamentum  flavum and facet arthropathy. There is a superimposed central disc  protrusion and annular fissure that mildly narrows the spinal canal.  There is moderate right and mild-to-moderate left neural foraminal  stenosis.                              "                        IMPRESSION:  Multilevel lumbar spondylosis greatest at L3-4 where there is severe  spinal canal stenosis secondary to a large disc extrusion as detailed  above.        XR LUMBAR SPINE 2/3 VIEWS  4/8/2024   IMPRESSION: Minimal retrolisthesis at L2-3 and anterolisthesis at  L4-5. Mild right convex curvature. No loss of vertebral body height.  Disc height loss and osteophyte formation at L5-S1.       XR SACROILIAC JOINT G/E 3 VIEWS  4/8/2024  IMPRESSION: No acute fracture or malalignment. Mild degenerative  changes of the bilateral hip and sacroiliac joints. No definite  erosive change. Moderate to severe degenerative changes at the  lumbosacral junction.         Review Of Systems:  I am responding to those symptoms which are directly relevant to the specific indication for my consultation. I recommend that the patient follow up with their primary or referring provider to pursue any other symptoms which may be of concern.       Medical History:  He has a past medical history of History of colonoscopy and Hypertension.     He has no past surgical history on file.    Family History  His family history includes Hypertension in his mother; Lipids in his mother.     Social History:  Work: for [employer], works from home, sitting most of the workday  Current living situation: Lives alone.   He  reports that he has been smoking cigarettes. He has a 15 pack-year smoking history. He has never used smokeless tobacco. He reports current alcohol use. He reports that he does not use drugs.        Current Medications:   He has a current medication list which includes the following prescription(s): hydrochlorothiazide, lisinopril, methylprednisolone, sildenafil, and simvastatin.     Allergies:   No Known Allergies    PHYSICAL EXAMINATION:  /77 (BP Location: Left arm, Patient Position: Sitting, Cuff Size: Adult Regular)   Pulse 89   Ht 1.783 m (5' 10.2\")   Wt 78.2 kg (172 lb " 4.8 oz)   BMI 24.58 kg/m     --CONSTITUTIONAL: Vital signs as above. No acute distress. The patient is well nourished and well groomed.  --PSYCHIATRIC: The patient is awake, alert, oriented to person, place, time and answering questions appropriately with clear speech. Appropriate mood and affect   --SKIN:  Posterior torso is clean, dry, intact without rashes.  No sign of infection at the TRIPP injection sites  --RESPIRATORY: Normal rhythm and effort. No abnormal accessory muscle breathing patterns noted.   --GROSS MOTOR: Easily arises from a seated position.   --STANDING EXAMINATION: Gait is non-antalgic. Normal lumbar lordosis noted, no lateral shift.  --LOWER EXTREMITY MOTOR TESTING:  Plantar flexion left 5/5, right 5/5   Dorsiflexion left 5/5, right 5/5   Great toe MTP extension left 5/5, right 5/5  Knee flexion left 5/5, right 5/5  Knee extension left 5/5, right 5/5   Hip flexion left 5/5, right 5/5  --MUSCULOSKELETAL: Lumbar spine inspection reveals no evidence of deformity.  --SACROILIAC JOINT: No pain with palpation of SI joint. Giulia negative.   --HIPS:  No pain with palpation of the greater trochanters.   --NEUROLOGIC: 1/4 patellar and achilles reflexes bilaterally.  Sensation to light touch is intact in the bilateral L4, L5, and S1 dermatomes.  No clonus.    --VASCULAR:  Warm lower limbs bilaterally. There is no pitting edema of the bilateral lower extremities.         ASSESSMENT:  Bezabeh Assefa is a pleasant 69 year old male who presented with a 4-month history of atraumatic pain which he described as:  -Pain radiating from the right buttocks to the front or back of the right thigh, right knee, and right shin (not past the ankle)  -Occasionally pain radiates to the right groin  -Occasional, less severe left lower extremity pain in the same distribution  Since initial evaluation, the pain has resolved.   Neurologic exam reassuring  No red flags.     MRI of the lumbar spine 8/6/2024:  -Multilevel lumbar  spondylosis, most notable at L3-4 where there is severe spinal canal stenosis/large disc extrusion      PLAN:  -Since last seen, the pain he was previously experiencing has now resolved.  He did proceed with IL TRIPP 9/18/2024.  We did discuss that if his symptoms return a repeat epidural steroid injection could be considered (no sooner than 3 months after this most recent TRIPP). However would avoid repeat TRIPP in absence of symptoms  -An order for PT was at the prior visit.  He prefers to hold off on PT for now, especially as he is leaving on a trip for [place] soon.  He is currently working with a  at his gym instead  -He is no longer requiring medication for pain.  No medication changes made today.  -Reviewed red flags which necessitate him being seen in the emergency department and he expressed understanding  -RTC as needed        Ready to learn, no apparent learning barriers.  Education provided on treatment plan according to patient's preferred learning style.  Patient verbalizes understanding.   __________________________________  Theresa Connor NP  Physical Medicine & Rehabilitation      32 minutes spent by me on the date of the encounter doing chart review, history and exam, documentation and further activities per the note

## 2024-09-27 ENCOUNTER — TELEPHONE (OUTPATIENT)
Dept: GASTROENTEROLOGY | Facility: CLINIC | Age: 69
End: 2024-09-27
Payer: COMMERCIAL

## 2024-09-27 NOTE — TELEPHONE ENCOUNTER
"Endoscopy Scheduling Screen    Have you had any respiratory illness or flu-like symptoms in the last 10 days?  No    What is your communication preference for Instructions and/or Bowel Prep?   MyChart    What insurance is in the chart?  Other:  [facility]    Ordering/Referring Provider:     Elias Ballesteros MD in  PRIMARY CARE      (If ordering provider performs procedure, schedule with ordering provider unless otherwise instructed. )    BMI: Estimated body mass index is 24.61 kg/m  as calculated from the following:    Height as of 8/27/24: 1.778 m (5' 10\").    Weight as of 8/27/24: 77.8 kg (171 lb 8 oz).     Sedation Ordered  moderate sedation.   If patient BMI > 50 do not schedule in [facility].    If patient BMI > 45 do not schedule at [facility].    Are you taking methadone or Suboxone?  NO, No RN review required.    Have you been diagnosed and are being treated for severe PTSD or severe anxiety?  NO, No RN review required.    Are you taking any prescription medications for pain 3 or more times per week?   NO, No RN review required.    Do you have a history of malignant hyperthermia?  No    (Females) Are you currently pregnant?   No     Have you been diagnosed or told you have pulmonary hypertension?   No    Do you have an LVAD?  No    Have you been told you have moderate to severe sleep apnea?  No.    Have you been told you have COPD, asthma, or any other lung disease?  No    Do you have any heart conditions?  No     Have you ever had or are you waiting for an organ transplant?  No. Continue scheduling, no site restrictions.    Have you had a stroke or transient ischemic attack (TIA aka \"mini stroke\" in the last 6 months?   No    Have you been diagnosed with or been told you have cirrhosis of the liver?   No.    Are you currently on dialysis?   No    Do you need assistance transferring?   No    BMI: Estimated body mass index is 24.61 kg/m  as calculated from the following:    Height as of 8/27/24: 1.778 m (5' 10\").    " Weight as of 8/27/24: 77.8 kg (171 lb 8 oz).     Is patients BMI > 40 and scheduling location [facility]?  No    Do you take an injectable or oral medication for weight loss or diabetes (excluding insulin)?  No    Do you take the medication Naltrexone?  No    Do you take blood thinners?  No       Prep   Are you currently on dialysis or do you have chronic kidney disease?  No    Do you have a diagnosis of diabetes?  No    Do you have a diagnosis of cystic fibrosis (CF)?  No    On a regular basis do you go 3 -5 days between bowel movements?  No    BMI > 40?  No    Preferred Pharmacy:    [id number] - [place], [place] - [address] [place] [address]  [address] [place] [id number]  Phone: [phone] Fax: [phone]      Final Scheduling Details     Procedure scheduled  Colonoscopy    Surgeon:       Date of procedure:  02/19/2025     Pre-OP / PAC:   No - Not required for this site.    Location  [facility] - [facility] - Patient preference.    Sedation   Moderate Sedation - Per order.      Patient Reminders:   You will receive a call from a Nurse to review instructions and health history.  This assessment must be completed prior to your procedure.  Failure to complete the Nurse assessment may result in the procedure being cancelled.      On the day of your procedure, please designate an adult(s) who can drive you home stay with you for the next 24 hours. The medicines used in the exam will make you sleepy. You will not be able to drive.      You cannot take public transportation, ride share services, or non-medical taxi service without a responsible caregiver.  Medical transport services are allowed with the requirement that a responsible caregiver will receive you at your destination.  We require that drivers and caregivers are confirmed prior to your procedure.

## 2024-10-03 ENCOUNTER — OFFICE VISIT (OUTPATIENT)
Dept: PHYSICAL MEDICINE AND REHAB | Facility: CLINIC | Age: 69
End: 2024-10-03
Payer: COMMERCIAL

## 2024-10-03 VITALS
SYSTOLIC BLOOD PRESSURE: 122 MMHG | BODY MASS INDEX: 24.67 KG/M2 | DIASTOLIC BLOOD PRESSURE: 77 MMHG | HEART RATE: 89 BPM | HEIGHT: 70 IN | WEIGHT: 172.3 LBS

## 2024-10-03 DIAGNOSIS — M51.26 LUMBAR DISC HERNIATION: ICD-10-CM

## 2024-10-03 DIAGNOSIS — M48.061 SPINAL STENOSIS OF LUMBAR REGION, UNSPECIFIED WHETHER NEUROGENIC CLAUDICATION PRESENT: Primary | ICD-10-CM

## 2024-10-03 DIAGNOSIS — M54.16 LUMBAR RADICULOPATHY: ICD-10-CM

## 2024-10-03 PROCEDURE — 99214 OFFICE O/P EST MOD 30 MIN: CPT | Performed by: NURSE PRACTITIONER

## 2024-10-03 ASSESSMENT — PAIN SCALES - GENERAL: PAINLEVEL: NO PAIN (0)

## 2024-10-03 NOTE — NURSING NOTE
Chief Complaint   Patient presents with    RECHECK     Med spine      Vitals were taken and medications were reconciled.   MELO Parikh  8:06 AM

## 2024-10-03 NOTE — LETTER
10/3/2024       RE: Bezabeh Assefa  [address]  [facility] [address]     Dear Colleague,    Thank you for referring your patient, Bezabeh Assefa, to the [facility]IN[facility] [place] at [facility]. Please see a copy of my visit note below.    PM&R Follow-Up Visit -     Date of Initial Visit: 8/6/2024  LOV: 9/18/2024 - procedure visit  TD: 10/3/2024     Recall: Bezabeh Assefa is a 69 year old male who presents with a chief complaint of lumbar radiculopathy.       INTERVAL HISTORY:  Patient was last seen 9/18/2024 for L4-5 ILESI with Dr Singh.   He was seen today in the clinic for follow up.   He reports that after the last clinic visit there was a delay in scheduling the steroid injection due to availability.  However, he says that days after being seen in the spine clinic his pain stopped. He started a stretching program and began working with a  at his gym.  He did hear from physical therapy scheduling, but opted to hold off for now.  He says he proceeded with the ILESI. He reports the prior pain he had described (RLE>LLE lower extremity pain) has remained resolved.   Since last time he was seen he also stopped cyclobenzaprine, gabapentin, or any other medication for pain.  He denies weakness, bowel or bladder incontinence, or saddle anesthesia.           RECALL HISTORY OF PRESENT ILLNESS:  Bezabeh Assefa is a 69 year old male who presents with a chief complaint of lumbar radiculopathy.       He was seen today in the clinic. He states that 4 months ago he woke up with excruciating pain and was unable to get out of bed.  There was no inciting accident or injury.  Over the last 4 months the pain has remained fairly constant.  He says that in the past he experienced back spasms once in a while, but denies a history of chronic low back pain or radicular pain.    He traveled out of the country to [facility]  earlier this year.  While there, he met with a physical therapist and was provided with home exercise program.  However, he has not been able to do the home exercise program due to severe pain with trying to exercise.  He also has not been able to do his typical exercise routine due to pain.      Today, he describes  -Pain radiating from the right buttocks to the front or back of the right thigh, right knee, and right shin (not past the ankle)  -Occasionally pain radiates to the right groin  -Occasional, less severe left lower extremity pain in the same distribution    He describes the lower extremity discomfort as pain and occasionally a burning sensation, but no numbness or tingling.      Aggravating factors include: Lying down, standing, walking, exercise, coughing/sneezing, laying supine, laying prone  Relieving factors include: Sitting    Today, he rates the pain 8/10.  Patient states sleep is affected.    He denies falls, weakness, bowel or bladder incontinence, saddle anesthesia, unintentional weight loss, no current fevers/chills or night sweats.       8/8/2024:  Patient was last seen in clinic 8/6/2024. At that visit, the plan was to obtain MRI of the lumbar spine, and trial cyclobenzaprine and gabapentin.    He was seen in the clinic today for review of the MRI.    At the last visit he described:  -Pain radiating from the right buttocks to the front or back of the right thigh, right knee, and right shin (not past the ankle)  -Occasionally pain radiates to the right groin  -Occasional, less severe left lower extremity pain in the same distribution    Since last visit, he picked up the prescriptions for gabapentin cyclobenzaprine and thus far has tried 1 dose of each.  He states that that they were helpful.  He says that he is having slightly less pain overall.  However, he has still been limiting his typical activities and has been sleeping on the floor on his yoga mat is sleeping on his usual firm mattress  aggravates the pain.  He otherwise denies any new symptoms since he was seen earlier this week        PRIOR INJURIES/TREATMENT:   Ice/Heat: + Uses the sauna & whirlpool at his gym  Brace: wearing lumbar brace occasionally    Physical Therapy:   Works with a  at his gym  Saw PT out of the country for 1 visit in May but unable to do the home exercise program due to significant pain      - Current Pain Medications -   None    - Prior/Trialed Pain Medications -   Gabapentin   Cyclobenzaprine  Absorbine menthol gel   tylenol  Ibuprofen  Valium (from ED for air travel to [facility], was helpful for sleep)  Medrol dose pack Rx 4/9/24 - no effect      Prior Procedures:  Date    Procedure   Improvement (%)  9/18/2024  L4-5 ILESI   Unclear, pain had improved prior to injection. Reports 100% improvement             Prior Related Surgery: none     Other (acupuncture, OMT, CMM, TENS, DME, etc.): none    Specialists Seen - (with most recent, available notes and clinic visits reviewed)   1. GUERRERO - [place]      IMAGING - reviewed   MR LUMBAR SPINE W/O CONTRAST  8/6/2024   FINDINGS:  There are 5 lumbar type vertebrae, used for the purposes of this  dictation. . No suspicious marrow lesion. Conus tip at approximately  L1. Normal cauda equina. Nonfocal extraspinal structures. There is  moderate to severe disc height loss at L5-S1 and mild loss at the L3-4  level. Degenerative endplate changes at L5-S1.     Level by level:  L1-L2: No significant spinal canal stenosis or neural foraminal  narrowing.     L2-3: Circumferential disc bulge without spinal canal stenosis or  neural foraminal narrowing.      L3-L4: Large posterior disc extrusion with thickening of the  ligamentum flavum and facet arthropathy causing severe spinal canal  stenosis. Mild bilateral neuroforaminal stenosis.     L4-L5: Mild broad-based posterior disc bulge with ligamentum flavum  thickening and facet arthropathy. Mild-to-moderate bilateral  neuroforaminal  stenosis, mild spinal canal stenosis.      L5-S1: Circumferential disc bulge with thickening of the ligamentum  flavum and facet arthropathy. There is a superimposed central disc  protrusion and annular fissure that mildly narrows the spinal canal.  There is moderate right and mild-to-moderate left neural foraminal  stenosis.                                                     IMPRESSION:  Multilevel lumbar spondylosis greatest at L3-4 where there is severe  spinal canal stenosis secondary to a large disc extrusion as detailed  above.        XR LUMBAR SPINE 2/3 VIEWS  4/8/2024   IMPRESSION: Minimal retrolisthesis at L2-3 and anterolisthesis at  L4-5. Mild right convex curvature. No loss of vertebral body height.  Disc height loss and osteophyte formation at L5-S1.       XR SACROILIAC JOINT G/E 3 VIEWS  4/8/2024  IMPRESSION: No acute fracture or malalignment. Mild degenerative  changes of the bilateral hip and sacroiliac joints. No definite  erosive change. Moderate to severe degenerative changes at the  lumbosacral junction.         Review Of Systems:  I am responding to those symptoms which are directly relevant to the specific indication for my consultation. I recommend that the patient follow up with their primary or referring provider to pursue any other symptoms which may be of concern.       Medical History:  He has a past medical history of History of colonoscopy and Hypertension.     He has no past surgical history on file.    Family History  His family history includes Hypertension in his mother; Lipids in his mother.     Social History:  Work: for [employer], works from home, sitting most of the workday  Current living situation: Lives alone.   He  reports that he has been smoking cigarettes. He has a 15 pack-year smoking history. He has never used smokeless tobacco. He reports current alcohol use. He reports that he does not use drugs.        Current Medications:   He has a  "current medication list which includes the following prescription(s): hydrochlorothiazide, lisinopril, methylprednisolone, sildenafil, and simvastatin.     Allergies:   No Known Allergies    PHYSICAL EXAMINATION:  /77 (BP Location: Left arm, Patient Position: Sitting, Cuff Size: Adult Regular)   Pulse 89   Ht 1.783 m (5' 10.2\")   Wt 78.2 kg (172 lb 4.8 oz)   BMI 24.58 kg/m     --CONSTITUTIONAL: Vital signs as above. No acute distress. The patient is well nourished and well groomed.  --PSYCHIATRIC: The patient is awake, alert, oriented to person, place, time and answering questions appropriately with clear speech. Appropriate mood and affect   --SKIN:  Posterior torso is clean, dry, intact without rashes.  No sign of infection at the TRIPP injection sites  --RESPIRATORY: Normal rhythm and effort. No abnormal accessory muscle breathing patterns noted.   --GROSS MOTOR: Easily arises from a seated position.   --STANDING EXAMINATION: Gait is non-antalgic. Normal lumbar lordosis noted, no lateral shift.  --LOWER EXTREMITY MOTOR TESTING:  Plantar flexion left 5/5, right 5/5   Dorsiflexion left 5/5, right 5/5   Great toe MTP extension left 5/5, right 5/5  Knee flexion left 5/5, right 5/5  Knee extension left 5/5, right 5/5   Hip flexion left 5/5, right 5/5  --MUSCULOSKELETAL: Lumbar spine inspection reveals no evidence of deformity.  --SACROILIAC JOINT: No pain with palpation of SI joint. Giulia negative.   --HIPS:  No pain with palpation of the greater trochanters.   --NEUROLOGIC: 1/4 patellar and achilles reflexes bilaterally.  Sensation to light touch is intact in the bilateral L4, L5, and S1 dermatomes.  No clonus.    --VASCULAR:  Warm lower limbs bilaterally. There is no pitting edema of the bilateral lower extremities.         ASSESSMENT:  Bezabeh Assefa is a pleasant 69 year old male who presented with a 4-month history of atraumatic pain which he described as:  -Pain radiating from the right buttocks to the " front or back of the right thigh, right knee, and right shin (not past the ankle)  -Occasionally pain radiates to the right groin  -Occasional, less severe left lower extremity pain in the same distribution  Since initial evaluation, the pain has resolved.   Neurologic exam reassuring  No red flags.     MRI of the lumbar spine 8/6/2024:  -Multilevel lumbar spondylosis, most notable at L3-4 where there is severe spinal canal stenosis/large disc extrusion      PLAN:  -Since last seen, the pain he was previously experiencing has now resolved.  He did proceed with IL TRIPP 9/18/2024.  We did discuss that if his symptoms return a repeat epidural steroid injection could be considered (no sooner than 3 months after this most recent TRIPP). However would avoid repeat TRIPP in absence of symptoms  -An order for PT was at the prior visit.  He prefers to hold off on PT for now, especially as he is leaving on a trip for [place] soon.  He is currently working with a  at his gym instead  -He is no longer requiring medication for pain.  No medication changes made today.  -Reviewed red flags which necessitate him being seen in the emergency department and he expressed understanding  -RTC as needed        Ready to learn, no apparent learning barriers.  Education provided on treatment plan according to patient's preferred learning style.  Patient verbalizes understanding.   __________________________________  Theresa Connor NP  Physical Medicine & Rehabilitation      32 minutes spent by me on the date of the encounter doing chart review, history and exam, documentation and further activities per the note         Again, thank you for allowing me to participate in the care of your patient.      Sincerely,    DEON Gardner CNP

## 2024-10-03 NOTE — PATIENT INSTRUCTIONS
"-It's great to hear you are feeling better!    -If you decide you would like to proceed with physical therapy, the scheduling phone number is [phone].    -We discussed \"red flags\" to watch out for. If you develop new/severe pain, numbness, or tingling, lose control of bowel or bladder, numbness between the legs/groin area, falls, or new weakness, go to the emergency department right away    -You indicated you would like to keep follow up in the spine clinic as needed for now. We are always happy to see you again in the spine clinic if you have new, worsening, or recurrent pain/other symptoms    Thank you,  Theresa Connor NP  Physical Medicine and Rehabilitation, Medical Spine  PM&R clinic Phone: [phone]  PM&R clinic Fax: [phone]         "

## 2025-02-19 ENCOUNTER — ANESTHESIA EVENT (OUTPATIENT)
Dept: SURGERY | Facility: AMBULATORY SURGERY CENTER | Age: 70
End: 2025-02-19
Payer: COMMERCIAL

## 2025-02-19 ENCOUNTER — ANESTHESIA (OUTPATIENT)
Dept: SURGERY | Facility: AMBULATORY SURGERY CENTER | Age: 70
End: 2025-02-19
Payer: COMMERCIAL

## 2025-02-19 VITALS — HEART RATE: 81 BPM

## 2025-02-19 RX ORDER — LIDOCAINE HYDROCHLORIDE 20 MG/ML
INJECTION, SOLUTION INFILTRATION; PERINEURAL PRN
Status: DISCONTINUED | OUTPATIENT
Start: 2025-02-19 | End: 2025-02-19

## 2025-02-19 RX ORDER — PROPOFOL 10 MG/ML
INJECTION, EMULSION INTRAVENOUS CONTINUOUS PRN
Status: DISCONTINUED | OUTPATIENT
Start: 2025-02-19 | End: 2025-02-19

## 2025-02-19 RX ORDER — SODIUM CHLORIDE, SODIUM LACTATE, POTASSIUM CHLORIDE, CALCIUM CHLORIDE 600; 310; 30; 20 MG/100ML; MG/100ML; MG/100ML; MG/100ML
INJECTION, SOLUTION INTRAVENOUS CONTINUOUS PRN
Status: DISCONTINUED | OUTPATIENT
Start: 2025-02-19 | End: 2025-02-19

## 2025-02-19 RX ORDER — PROPOFOL 10 MG/ML
INJECTION, EMULSION INTRAVENOUS PRN
Status: DISCONTINUED | OUTPATIENT
Start: 2025-02-19 | End: 2025-02-19

## 2025-02-19 RX ADMIN — LIDOCAINE HYDROCHLORIDE 80 MG: 20 INJECTION, SOLUTION INFILTRATION; PERINEURAL at 13:12

## 2025-02-19 RX ADMIN — SODIUM CHLORIDE, SODIUM LACTATE, POTASSIUM CHLORIDE, CALCIUM CHLORIDE: 600; 310; 30; 20 INJECTION, SOLUTION INTRAVENOUS at 13:12

## 2025-02-19 RX ADMIN — PROPOFOL 50 MG: 10 INJECTION, EMULSION INTRAVENOUS at 13:15

## 2025-02-19 RX ADMIN — PROPOFOL 200 MCG/KG/MIN: 10 INJECTION, EMULSION INTRAVENOUS at 13:14

## 2025-02-19 ASSESSMENT — COPD QUESTIONNAIRES: COPD: 0

## 2025-02-19 ASSESSMENT — ENCOUNTER SYMPTOMS: SEIZURES: 0

## 2025-02-19 NOTE — ANESTHESIA POSTPROCEDURE EVALUATION
Patient: Bezabeh Assefa    Procedure: Procedure(s):  COLONOSCOPY, WITH POLYPECTOMY       Anesthesia Type:  MAC    Note:  Disposition: Outpatient   Postop Pain Control: Uneventful            Sign Out: Well controlled pain   PONV: No   Neuro/Psych: Uneventful            Sign Out: Acceptable/Baseline neuro status   Airway/Respiratory: Uneventful            Sign Out: Acceptable/Baseline resp. status   CV/Hemodynamics: Uneventful            Sign Out: Acceptable CV status; No obvious hypovolemia; No obvious fluid overload   Other NRE: NONE   DID A NON-ROUTINE EVENT OCCUR? No           Last vitals:  Vitals Value Taken Time   /63 02/19/25 1350   Temp 36.1  C (97  F) 02/19/25 1345   Pulse 86 02/19/25 1350   Resp 15 02/19/25 1345   SpO2 98 % 02/19/25 1358   Vitals shown include unfiled device data.    Electronically Signed By: David Dominguez MD  February 19, 2025  1:59 PM

## 2025-02-19 NOTE — ANESTHESIA CARE TRANSFER NOTE
Patient: Bezabeh Assefa    Procedure: Procedure(s):  COLONOSCOPY, WITH POLYPECTOMY       Diagnosis: Screen for colon cancer [Z12.11]  Diagnosis Additional Information: No value filed.    Anesthesia Type:   MAC     Note:    Oropharynx: spontaneously breathing  Level of Consciousness: awake  Oxygen Supplementation: room air    Independent Airway: airway patency satisfactory and stable  Dentition: dentition unchanged  Vital Signs Stable: post-procedure vital signs reviewed and stable  Report to RN Given: handoff report given  Patient transferred to: Phase II    Handoff Report: Identifed the Patient, Identified the Reponsible Provider, Reviewed the pertinent medical history, Discussed the surgical course, Reviewed Intra-OP anesthesia mangement and issues during anesthesia, Set expectations for post-procedure period and Allowed opportunity for questions and acknowledgement of understanding      Vitals:  Vitals Value Taken Time   /67 02/19/25 1343   Temp 96.9    Pulse 90 02/19/25 1343   Resp     SpO2 100 % 02/19/25 1345   Vitals shown include unfiled device data.    Electronically Signed By: DEON Chapman CRNA  February 19, 2025  1:46 PM

## 2025-02-19 NOTE — ANESTHESIA PREPROCEDURE EVALUATION
Anesthesia Pre-Procedure Evaluation    Patient: Bezabeh Assefa   MRN: [id number] : 1955        Procedure : Procedure(s):  Colonoscopy          Past Medical History:   Diagnosis Date    History of colonoscopy     Next due 5 years (2017)    Hypertension       Past Surgical History:   Procedure Laterality Date    INJECT EPIDURAL LUMBAR Right 2024    Procedure: L4-5 Interlaminar epidural steroid injection;  Surgeon: Suresh Singh MD;  Location: [facility] OR      No Known Allergies   Social History     Tobacco Use    Smoking status: Every Day     Current packs/day: 0.50     Average packs/day: 0.5 packs/day for 30.0 years (15.0 ttl pk-yrs)     Types: Cigarettes    Smokeless tobacco: Never   Substance Use Topics    Alcohol use: Yes     Comment: 1 every 3 - 4 months      Wt Readings from Last 1 Encounters:   25 74.8 kg (165 lb)        Anesthesia Evaluation   Pt has had prior anesthetic.     No history of anesthetic complications       ROS/MED HX  ENT/Pulmonary:  - neg pulmonary ROS  (-) asthma, COPD and sleep apnea   Neurologic:  - neg neurologic ROS  (-) no seizures and no CVA   Cardiovascular:  - neg cardiovascular ROS   (+)  hypertension- -   -  - -                                   (-) murmur   METS/Exercise Tolerance: >4 METS    Hematologic:  - neg hematologic  ROS  (-) history of blood clots   Musculoskeletal:       GI/Hepatic:  - neg GI/hepatic ROS  (-) GERD   Renal/Genitourinary:  - neg Renal ROS     Endo:  - neg endo ROS  (-) Type II DM   Psychiatric/Substance Use:  - neg psychiatric ROS     Infectious Disease:       Malignancy:       Other:            Physical Exam    Airway        Mallampati: II   TM distance: > 3 FB   Neck ROM: full     Respiratory Devices and Support         Dental       (+) Edentulous      Cardiovascular          Rhythm and rate: regular and normal (-) no murmur    Pulmonary           breath sounds clear to auscultation           OUTSIDE LABS:  CBC:   Lab Results  "  Component Value Date    WBC 5.7 03/13/2014    WBC 5.5 12/02/2011    HGB 15.6 03/13/2014    HGB 15.6 12/02/2011    HCT 44.5 03/13/2014    HCT 46.7 12/02/2011     03/13/2014     12/02/2011     BMP:   Lab Results   Component Value Date     08/27/2024     06/16/2023    POTASSIUM 3.9 08/27/2024    POTASSIUM 3.9 06/16/2023    CHLORIDE 99 08/27/2024    CHLORIDE 101 06/16/2023    CO2 26 08/27/2024    CO2 24 06/16/2023    BUN 6.6 (L) 08/27/2024    BUN 10.1 06/16/2023    CR 0.95 08/27/2024    CR 0.83 06/16/2023     (H) 08/27/2024    GLC 98 06/16/2023     COAGS: No results found for: \"PTT\", \"INR\", \"FIBR\"  POC: No results found for: \"BGM\", \"HCG\", \"HCGS\"  HEPATIC:   Lab Results   Component Value Date    ALBUMIN 4.4 08/27/2024    PROTTOTAL 7.4 08/27/2024    ALT 23 08/27/2024    AST 27 08/27/2024    ALKPHOS 71 08/27/2024    BILITOTAL 0.4 08/27/2024     OTHER:   Lab Results   Component Value Date    DEISI 9.5 08/27/2024    TSH 1.20 05/09/2016       Anesthesia Plan    ASA Status:  2    NPO Status:  NPO Appropriate    Anesthesia Type: MAC.   Induction: Propofol.   Maintenance: TIVA.        Consents    Anesthesia Plan(s) and associated risks, benefits, and realistic alternatives discussed. Questions answered and patient/representative(s) expressed understanding.     - Discussed: Risks, Benefits and Alternatives for BOTH SEDATION and the PROCEDURE were discussed     - Discussed with:  Patient      - Extended Intubation/Ventilatory Support Discussed: No.      - Patient is DNR/DNI Status: No     Use of blood products discussed: No .     Postoperative Care       PONV prophylaxis: Ondansetron (or other 5HT-3)     Comments:               David Dominguez MD    I have reviewed the pertinent notes and labs in the chart from the past 30 days and (re)examined the patient.  Any updates or changes from those notes are reflected in this note.    Clinically Significant Risk Factors Present on Admission              "      # Hypertension: Noted on problem list

## 2025-02-20 PROCEDURE — 88305 TISSUE EXAM BY PATHOLOGIST: CPT | Mod: TC | Performed by: INTERNAL MEDICINE

## 2025-02-20 PROCEDURE — 88305 TISSUE EXAM BY PATHOLOGIST: CPT | Mod: 26 | Performed by: PATHOLOGY

## 2025-03-05 PROBLEM — D12.6 ADENOMATOUS POLYP OF COLON: Status: ACTIVE | Noted: 2025-03-05

## 2025-03-06 ENCOUNTER — PATIENT OUTREACH (OUTPATIENT)
Dept: GASTROENTEROLOGY | Facility: CLINIC | Age: 70
End: 2025-03-06
Payer: COMMERCIAL

## 2025-04-24 ENCOUNTER — OFFICE VISIT (OUTPATIENT)
Dept: URGENT CARE | Facility: URGENT CARE | Age: 70
End: 2025-04-24
Payer: COMMERCIAL

## 2025-04-24 VITALS
TEMPERATURE: 97.6 F | HEIGHT: 69 IN | SYSTOLIC BLOOD PRESSURE: 122 MMHG | HEART RATE: 96 BPM | BODY MASS INDEX: 24.9 KG/M2 | RESPIRATION RATE: 16 BRPM | DIASTOLIC BLOOD PRESSURE: 61 MMHG | OXYGEN SATURATION: 96 % | WEIGHT: 168.1 LBS

## 2025-04-24 DIAGNOSIS — J06.9 VIRAL URI WITH COUGH: Primary | ICD-10-CM

## 2025-04-24 DIAGNOSIS — H61.23 BILATERAL IMPACTED CERUMEN: ICD-10-CM

## 2025-04-24 DIAGNOSIS — R05.1 ACUTE COUGH: ICD-10-CM

## 2025-04-24 LAB
FLUAV AG SPEC QL IA: NEGATIVE
FLUBV AG SPEC QL IA: NEGATIVE

## 2025-04-24 RX ORDER — BENZONATATE 200 MG/1
200 CAPSULE ORAL 3 TIMES DAILY PRN
Qty: 21 CAPSULE | Refills: 0 | Status: SHIPPED | OUTPATIENT
Start: 2025-04-24

## 2025-04-24 NOTE — PROGRESS NOTES
Urgent Care Clinic Visit    Chief Complaint   Patient presents with    Cough     C/o dry cough, runny nose onset Tuesday. Cough was constant has since subsided. Pt has taken Tylenol and Nyquil    Nasal Congestion               4/24/2025    11:31 AM   Additional Questions   Roomed by lillie huber   Accompanied by n/a

## 2025-04-24 NOTE — PATIENT INSTRUCTIONS
Ear irrigation performed for you in the clinic today.  You can use an at home ear irrigation kit to help prevent earwax buildup in the future.  Influenza test is negative.  Take the benzonatate as prescribed for the cough.  You can also use nasal saline spray, humidifier/steam and/or honey for your symptoms.

## 2025-04-24 NOTE — PROGRESS NOTES
"ASSESSMENT:  (J06.9) Viral URI with cough  (primary encounter diagnosis)  Plan: benzonatate (TESSALON) 200 MG capsule    (R05.1) Acute cough  Plan: Influenza A & B Antigen    (H61.23) Bilateral impacted cerumen  Plan: NV REMOVAL IMPACTED CERUMEN IRRIGATION/LVG         UNILAT    PLAN:  Reassessment of the patient after the ear irrigation; patient reports resolution of symptoms with his ears and states \"I can hear again\".  Tympanic membranes without erythema, bulging or effusion.  Informed the patient that his influenza test is negative.  We discussed taking benzonatate as prescribed for the cough.  We also discussed using nasal saline spray, humidifier/steam and/or honey for the symptoms.  Informed the patient to return to clinic with any new or worsening symptoms.  Patient acknowledged his understanding of the above plan.    The use of [employer] may have been used to construct the content in this note; any grammatical or spelling errors are non-intentional. Please contact the author of this note directly if you are in need of any clarification.      DEON Ledesma CNP      SUBJECTIVE:   Bezabeh Assefa is a 70 year old male presenting with a chief complaint of runny nose, stuffy nose, and cough - non-productive.  Onset of symptoms was 2 day(s) ago.  Course of illness is improving.    Patient denies: ear pain, sore throat, vomiting, and diarrhea  Treatment measures tried include Tylenol and OTC Cough med.  Predisposing factors include None.    ROS:  Negative except noted above.    OBJECTIVE:  /61 (BP Location: Left arm, Patient Position: Sitting, Cuff Size: Adult Regular)   Pulse 96   Temp 97.6  F (36.4  C) (Oral)   Resp 16   Ht 1.753 m (5' 9\")   Wt 76.2 kg (168 lb 1.6 oz)   SpO2 96%   BMI 24.82 kg/m    GENERAL APPEARANCE: healthy, alert and no distress  EYES: EOMI,  PERRL, conjunctiva clear  HENT: nose and mouth without erythema, ulcers or lesions, cerumen bilateral, " and oral mucous membranes moist, no erythema noted  NECK: supple, nontender, no lymphadenopathy  RESP: lungs clear to auscultation - no rales, rhonchi or wheezes  CV: regular rates and rhythm, normal S1 S2, no murmur noted  SKIN: no suspicious lesions or rashes

## 2025-07-28 ENCOUNTER — PATIENT OUTREACH (OUTPATIENT)
Dept: CARE COORDINATION | Facility: CLINIC | Age: 70
End: 2025-07-28
Payer: COMMERCIAL

## (undated) DEVICE — TUBING EXTENSION SET MICROBORE 6" LL 2N1194

## (undated) DEVICE — SYR 07ML EPIDURAL LOSS OF RESISTANCE PULSATOR 4905

## (undated) DEVICE — LINEN TOWEL PACK X5 5464

## (undated) DEVICE — PREP CHLORAPREP W/ORANGE TINT 10.5ML 260715

## (undated) DEVICE — TRAY PAIN INJECTION 97A 640

## (undated) DEVICE — GLOVE BIOGEL PI ULTRATOUCH SZ 7.0 41170

## (undated) DEVICE — DRSG PRIMAPORE 02X3" 7133

## (undated) DEVICE — NDL EPIDURAL TUOHY 20GA 3.5" 405028